# Patient Record
Sex: FEMALE | Race: WHITE | HISPANIC OR LATINO | Employment: FULL TIME | URBAN - METROPOLITAN AREA
[De-identification: names, ages, dates, MRNs, and addresses within clinical notes are randomized per-mention and may not be internally consistent; named-entity substitution may affect disease eponyms.]

---

## 2017-08-30 ENCOUNTER — HOSPITAL ENCOUNTER (EMERGENCY)
Facility: MEDICAL CENTER | Age: 62
End: 2017-08-30
Attending: EMERGENCY MEDICINE
Payer: COMMERCIAL

## 2017-08-30 VITALS
RESPIRATION RATE: 20 BRPM | BODY MASS INDEX: 33.78 KG/M2 | TEMPERATURE: 97.9 F | WEIGHT: 160.94 LBS | DIASTOLIC BLOOD PRESSURE: 76 MMHG | SYSTOLIC BLOOD PRESSURE: 116 MMHG | OXYGEN SATURATION: 99 % | HEIGHT: 58 IN | HEART RATE: 51 BPM

## 2017-08-30 DIAGNOSIS — G44.209 TENSION HEADACHE: ICD-10-CM

## 2017-08-30 DIAGNOSIS — S39.012A LUMBOSACRAL STRAIN, INITIAL ENCOUNTER: ICD-10-CM

## 2017-08-30 LAB
ALBUMIN SERPL BCP-MCNC: 3.9 G/DL (ref 3.2–4.9)
ALBUMIN/GLOB SERPL: 1.1 G/DL
ALP SERPL-CCNC: 100 U/L (ref 30–99)
ALT SERPL-CCNC: 21 U/L (ref 2–50)
ANION GAP SERPL CALC-SCNC: 5 MMOL/L (ref 0–11.9)
APPEARANCE UR: CLEAR
AST SERPL-CCNC: 22 U/L (ref 12–45)
BASOPHILS # BLD AUTO: 0.4 % (ref 0–1.8)
BASOPHILS # BLD: 0.03 K/UL (ref 0–0.12)
BILIRUB SERPL-MCNC: 0.7 MG/DL (ref 0.1–1.5)
BUN SERPL-MCNC: 16 MG/DL (ref 8–22)
CALCIUM SERPL-MCNC: 8.8 MG/DL (ref 8.4–10.2)
CHLORIDE SERPL-SCNC: 106 MMOL/L (ref 96–112)
CO2 SERPL-SCNC: 26 MMOL/L (ref 20–33)
COLOR UR: YELLOW
CREAT SERPL-MCNC: 0.53 MG/DL (ref 0.5–1.4)
EOSINOPHIL # BLD AUTO: 0.04 K/UL (ref 0–0.51)
EOSINOPHIL NFR BLD: 0.5 % (ref 0–6.9)
ERYTHROCYTE [DISTWIDTH] IN BLOOD BY AUTOMATED COUNT: 36.7 FL (ref 35.9–50)
GFR SERPL CREATININE-BSD FRML MDRD: >60 ML/MIN/1.73 M 2
GLOBULIN SER CALC-MCNC: 3.4 G/DL (ref 1.9–3.5)
GLUCOSE SERPL-MCNC: 100 MG/DL (ref 65–99)
GLUCOSE UR STRIP.AUTO-MCNC: NEGATIVE MG/DL
HCT VFR BLD AUTO: 36.2 % (ref 37–47)
HGB BLD-MCNC: 12.1 G/DL (ref 12–16)
IMM GRANULOCYTES # BLD AUTO: 0.01 K/UL (ref 0–0.11)
IMM GRANULOCYTES NFR BLD AUTO: 0.1 % (ref 0–0.9)
KETONES UR STRIP.AUTO-MCNC: NEGATIVE MG/DL
LEUKOCYTE ESTERASE UR QL STRIP.AUTO: NEGATIVE
LIPASE SERPL-CCNC: 28 U/L (ref 7–58)
LYMPHOCYTES # BLD AUTO: 1.42 K/UL (ref 1–4.8)
LYMPHOCYTES NFR BLD: 18.9 % (ref 22–41)
MCH RBC QN AUTO: 27.3 PG (ref 27–33)
MCHC RBC AUTO-ENTMCNC: 33.4 G/DL (ref 33.6–35)
MCV RBC AUTO: 81.7 FL (ref 81.4–97.8)
MONOCYTES # BLD AUTO: 0.35 K/UL (ref 0–0.85)
MONOCYTES NFR BLD AUTO: 4.7 % (ref 0–13.4)
NEUTROPHILS # BLD AUTO: 5.65 K/UL (ref 2–7.15)
NEUTROPHILS NFR BLD: 75.4 % (ref 44–72)
NITRITE UR QL STRIP.AUTO: NEGATIVE
NRBC # BLD AUTO: 0 K/UL
NRBC BLD AUTO-RTO: 0 /100 WBC
PH UR STRIP.AUTO: 6.5 [PH]
PLATELET # BLD AUTO: 213 K/UL (ref 164–446)
PMV BLD AUTO: 9.6 FL (ref 9–12.9)
POTASSIUM SERPL-SCNC: 3.7 MMOL/L (ref 3.6–5.5)
PROT SERPL-MCNC: 7.3 G/DL (ref 6–8.2)
PROT UR QL STRIP: 30 MG/DL
RBC # BLD AUTO: 4.43 M/UL (ref 4.2–5.4)
RBC UR QL AUTO: NEGATIVE
SODIUM SERPL-SCNC: 137 MMOL/L (ref 135–145)
SP GR UR STRIP.AUTO: 1.02
WBC # BLD AUTO: 7.5 K/UL (ref 4.8–10.8)

## 2017-08-30 PROCEDURE — 81002 URINALYSIS NONAUTO W/O SCOPE: CPT

## 2017-08-30 PROCEDURE — 85025 COMPLETE CBC W/AUTO DIFF WBC: CPT

## 2017-08-30 PROCEDURE — 700105 HCHG RX REV CODE 258: Performed by: EMERGENCY MEDICINE

## 2017-08-30 PROCEDURE — 36415 COLL VENOUS BLD VENIPUNCTURE: CPT

## 2017-08-30 PROCEDURE — 83690 ASSAY OF LIPASE: CPT

## 2017-08-30 PROCEDURE — 96374 THER/PROPH/DIAG INJ IV PUSH: CPT

## 2017-08-30 PROCEDURE — 96375 TX/PRO/DX INJ NEW DRUG ADDON: CPT

## 2017-08-30 PROCEDURE — 99284 EMERGENCY DEPT VISIT MOD MDM: CPT

## 2017-08-30 PROCEDURE — 700111 HCHG RX REV CODE 636 W/ 250 OVERRIDE (IP): Performed by: EMERGENCY MEDICINE

## 2017-08-30 PROCEDURE — 80053 COMPREHEN METABOLIC PANEL: CPT

## 2017-08-30 RX ORDER — KETOROLAC TROMETHAMINE 30 MG/ML
30 INJECTION, SOLUTION INTRAMUSCULAR; INTRAVENOUS ONCE
Status: COMPLETED | OUTPATIENT
Start: 2017-08-30 | End: 2017-08-30

## 2017-08-30 RX ORDER — ONDANSETRON 2 MG/ML
4 INJECTION INTRAMUSCULAR; INTRAVENOUS ONCE
Status: COMPLETED | OUTPATIENT
Start: 2017-08-30 | End: 2017-08-30

## 2017-08-30 RX ORDER — TRAMADOL HYDROCHLORIDE 50 MG/1
50-100 TABLET ORAL EVERY 6 HOURS PRN
Qty: 10 TAB | Refills: 0 | Status: SHIPPED | OUTPATIENT
Start: 2017-08-30 | End: 2018-09-22 | Stop reason: CLARIF

## 2017-08-30 RX ORDER — ONDANSETRON 4 MG/1
4 TABLET, ORALLY DISINTEGRATING ORAL EVERY 8 HOURS PRN
Qty: 10 TAB | Refills: 0 | Status: SHIPPED | OUTPATIENT
Start: 2017-08-30 | End: 2018-09-22 | Stop reason: CLARIF

## 2017-08-30 RX ORDER — SODIUM CHLORIDE 9 MG/ML
1000 INJECTION, SOLUTION INTRAVENOUS ONCE
Status: COMPLETED | OUTPATIENT
Start: 2017-08-30 | End: 2017-08-30

## 2017-08-30 RX ADMIN — SODIUM CHLORIDE 1000 ML: 9 INJECTION, SOLUTION INTRAVENOUS at 16:23

## 2017-08-30 RX ADMIN — KETOROLAC TROMETHAMINE 30 MG: 30 INJECTION, SOLUTION INTRAMUSCULAR at 17:05

## 2017-08-30 RX ADMIN — ONDANSETRON 4 MG: 2 INJECTION INTRAMUSCULAR; INTRAVENOUS at 16:23

## 2017-08-30 ASSESSMENT — PAIN SCALES - GENERAL
PAINLEVEL_OUTOF10: 9
PAINLEVEL_OUTOF10: 7

## 2017-08-30 NOTE — ED PROVIDER NOTES
ED Provider Note    CHIEF COMPLAINT  Chief Complaint   Patient presents with   • N/V     Beginning this morning. Denies blood in emesis   • Headache     Today . Took tylenol without result   • Low Back Pain       HPI  Karen Tran is a 61 y.o. female who presents For evaluation of numerous complaints including nausea vomiting without diarrhea, mild headache left flank pain and burning with urination. Patient has an isolated medical history of thyroid disease. No significant medical history other than that. She has 5 normal pregnancies denies current pregnancy.. No associated high fever hematemesis hematochezia. She does not take any significant over-the-counter NSAIDs. She currently denies any abdominal pain no night sweats or weight loss.    REVIEW OF SYSTEMS  See HPI for further details. No high fevers chills night sweats weight loss numbness weakness tingling rash All other systems are negative.     PAST MEDICAL HISTORY  Past Medical History:   Diagnosis Date   • Thyroid disorder    Hyperlipidemia    FAMILY HISTORY  No history of bleeding disorder    SOCIAL HISTORY  Social History     Social History   • Marital status:      Spouse name: N/A   • Number of children: N/A   • Years of education: N/A     Social History Main Topics   • Smoking status: Never Smoker   • Smokeless tobacco: Not on file   • Alcohol use Yes      Comment: occasional   • Drug use: No   • Sexual activity: Not on file     Other Topics Concern   • Not on file     Social History Narrative   • No narrative on file     Nonsmoker no drugs or alcohol  SURGICAL HISTORY  No past surgical history on file.  None reported  CURRENT MEDICATIONS  No current facility-administered medications for this encounter.     Current Outpatient Prescriptions:   •  pravastatin (PRAVACHOL) 20 MG TABS, Take 20 mg by mouth every evening., Disp: , Rfl:   •  levothyroxine (SYNTHROID) 100 MCG TABS, Take 100 mcg by mouth every day., Disp: , Rfl:       ALLERGIES  No  "Known Allergies    PHYSICAL EXAM  VITAL SIGNS: /76   Pulse 60   Temp 36.6 °C (97.9 °F)   Resp 18   Ht 1.473 m (4' 10\")   Wt 73 kg (160 lb 15 oz)   BMI 33.64 kg/m²  Room air O2: 94    Constitutional: Patient appears mildly nauseous nontoxic however  HENT: Normocephalic, Atraumatic, Bilateral external ears normal, Oropharynx moist, No oral exudates, Nose normal.   Eyes: PERRLA, EOMI, Conjunctiva normal, No discharge.   Neck: Normal range of motion, No tenderness, Supple, No stridor.   Cardiovascular: Normal heart rate, Normal rhythm, No murmurs, No rubs, No gallops.   Thorax & Lungs: Normal breath sounds, No respiratory distress, No wheezing, No chest tenderness.   Abdomen: Bowel sounds normal, Soft, No tenderness, No masses, No pulsatile masses.   Skin: Warm, Dry, No erythema, No rash.   Back: Left-sided paraspinal spasm lumbar region, No CVA tenderness.   Extremities: Intact distal pulses, No edema, No tenderness, No cyanosis, No clubbing.   Musculoskeletal: Good range of motion in all major joints. No tenderness to palpation or major deformities noted.   Neurologic: Alert & oriented x 3, Normal motor function, Normal sensory function, No focal deficits noted.   Psychiatric: Anxious      RADIOLOGY/PROCEDURES  Results for orders placed or performed during the hospital encounter of 08/30/17   CBC WITH DIFFERENTIAL   Result Value Ref Range    WBC 7.5 4.8 - 10.8 K/uL    RBC 4.43 4.20 - 5.40 M/uL    Hemoglobin 12.1 12.0 - 16.0 g/dL    Hematocrit 36.2 (L) 37.0 - 47.0 %    MCV 81.7 81.4 - 97.8 fL    MCH 27.3 27.0 - 33.0 pg    MCHC 33.4 (L) 33.6 - 35.0 g/dL    RDW 36.7 35.9 - 50.0 fL    Platelet Count 213 164 - 446 K/uL    MPV 9.6 9.0 - 12.9 fL    Neutrophils-Polys 75.40 (H) 44.00 - 72.00 %    Lymphocytes 18.90 (L) 22.00 - 41.00 %    Monocytes 4.70 0.00 - 13.40 %    Eosinophils 0.50 0.00 - 6.90 %    Basophils 0.40 0.00 - 1.80 %    Immature Granulocytes 0.10 0.00 - 0.90 %    Nucleated RBC 0.00 /100 WBC    " Neutrophils (Absolute) 5.65 2.00 - 7.15 K/uL    Lymphs (Absolute) 1.42 1.00 - 4.80 K/uL    Monos (Absolute) 0.35 0.00 - 0.85 K/uL    Eos (Absolute) 0.04 0.00 - 0.51 K/uL    Baso (Absolute) 0.03 0.00 - 0.12 K/uL    Immature Granulocytes (abs) 0.01 0.00 - 0.11 K/uL    NRBC (Absolute) 0.00 K/uL   COMP METABOLIC PANEL   Result Value Ref Range    Sodium 137 135 - 145 mmol/L    Potassium 3.7 3.6 - 5.5 mmol/L    Chloride 106 96 - 112 mmol/L    Co2 26 20 - 33 mmol/L    Anion Gap 5.0 0.0 - 11.9    Glucose 100 (H) 65 - 99 mg/dL    Bun 16 8 - 22 mg/dL    Creatinine 0.53 0.50 - 1.40 mg/dL    Calcium 8.8 8.4 - 10.2 mg/dL    AST(SGOT) 22 12 - 45 U/L    ALT(SGPT) 21 2 - 50 U/L    Alkaline Phosphatase 100 (H) 30 - 99 U/L    Total Bilirubin 0.7 0.1 - 1.5 mg/dL    Albumin 3.9 3.2 - 4.9 g/dL    Total Protein 7.3 6.0 - 8.2 g/dL    Globulin 3.4 1.9 - 3.5 g/dL    A-G Ratio 1.1 g/dL   LIPASE   Result Value Ref Range    Lipase 28 7 - 58 U/L   ESTIMATED GFR   Result Value Ref Range    GFR If African American >60 >60 mL/min/1.73 m 2    GFR If Non African American >60 >60 mL/min/1.73 m 2   POC UA   Result Value Ref Range    POC Color Yellow     POC Appearance Clear     POC Glucose Negative Negative mg/dL    POC Ketones Negative Negative mg/dL    POC Specific Gravity 1.020 1.005 - 1.030    POC Blood Negative Negative    POC Urine PH 6.5 5.0 - 8.0    POC Protein 30 (A) Negative mg/dL    POC Nitrites Negative Negative    POC Leukocyte Esterase Negative Negative        COURSE & MEDICAL DECISION MAKING  Pertinent Labs & Imaging studies reviewed. (See chart for details)  An IV was established. The patient was given IV nausea medication. Clinically she did not appear toxic. Vital signs were stable and normal. Extensive laboratory studies including CBC and metabolic panel urinalysis are all unremarkable. She does not appear affected. I suspect that she has muscular skeletal back pain with a tension-type headache. No indication for emergent imaging or  CT scan as she has a normal neurological exam. I also administered some Toradol. I will send her home with some Zofran and a small amount of Ultram. I recommended that she follow up with her PCP in 2-3 days and return here if symptoms progress or worsen    FINAL IMPRESSION  1. Tension headache  2. Lumbosacral strain       Electronically signed by: Vimal Salomon, 8/30/2017 4:08 PM

## 2017-08-30 NOTE — ED NOTES
Call light in reach. Man in low and locked position. Pt instructed to call for assistance. Son is at bedside. Pt O2 sat drops to 88% on RA while sleeping. 2L O2 via NC applied. Pt denies sob.

## 2017-08-30 NOTE — ED NOTES
Chief Complaint   Patient presents with   • N/V     Beginning this morning. Denies blood in emesis   • Headache     Today . Took tylenol without result   • Low Back Pain   Pt denies urinary symptoms. Reports regular urination, BM.

## 2017-08-31 NOTE — DISCHARGE INSTRUCTIONS
Dolor de basilio general sin causa   (General Headache Without Cause)   Un dolor de basilio en general es un dolor o malestar que se siente en la danyelle de la basilio o del jose. Se desconocen las causas.   CUIDADOS EN EL HOGAR   · Cumpla con los controles médicos según las indicaciones.  · [place] sólo los medicamentos que le haya indicado el médico.  · Cuando sienta dolor de basilio acuéstese en un cuarto oscuro y tranquilo.  · Lleve un registro diario para averiguar si ciertas cosas provocan angie de basilio. Por ejemplo, escriba:  ¨ Lo que come y vivi.  ¨ Cuánto tiempo duerme.  ¨ Todo cambio en la dieta o medicamentos.  · Relájese recibiendo masajes o ruby otras actividades relajantes.  · Coloque hielo o calor en la basilio y el jose jacqueline lo indique brasher médico.  · DISMINUYA EL NIVEL DE ESTRÉS  · Siéntase con la espalda recta. No apriete los músculos (tensione).  · Si fuma, deje de hacerlo.  · [place] menos alcohol.  · Consuma menos cafeína o deje de tomarla.  · Coma y duerma en horarios regulares.  · Duerma entre 7 y 9 horas o jacqueline le indique brasher médico.  · Mantenga las luces tenues si le molesta las luces brillantes o nickie angie de basilio empeoran.  SOLICITE AYUDA DE INMEDIATO SI:   · El dolor de basilio empeora.  · Tiene fiebre.  · Presenta rigidez en el jose.  · Tiene dificultad para angeles.  · Nickie músculos están débiles o pierde el control muscular.  · Pierde equilibrio o tiene problemas para caminar.  · Siente que se desvanece (debilidad) o se desmaya.  · Tiene síntomas intensos que son diferentes a los primeros síntomas.  · Tiene problemas con los medicamentos que le recetó brasher médico.  · El medicamento no le hace efecto.  · Siente que el dolor de basilio es diferente a otros angie de basilio.  · Tiene malestar estomacal (náuseas) o (vómitos).     Esta información no tiene jacqueline fin reemplazar el consejo del médico. Asegúrese de hacerle al médico cualquier pregunta que tenga.     Document Released: 03/11/2013 Document  Revised: 05/03/2016  Elsevier Interactive Patient Education ©2016 [employer]

## 2017-08-31 NOTE — ED NOTES
Pt discharged with written instructions. Pt verbalized understanding of all instructions. Pt ambulating independently. Pt reports pain and nausea is much better. Son at bedside to drive pt home. VSS. No s/s of distress.

## 2018-09-22 ENCOUNTER — APPOINTMENT (OUTPATIENT)
Dept: RADIOLOGY | Facility: MEDICAL CENTER | Age: 63
End: 2018-09-22
Attending: EMERGENCY MEDICINE
Payer: COMMERCIAL

## 2018-09-22 ENCOUNTER — HOSPITAL ENCOUNTER (EMERGENCY)
Facility: MEDICAL CENTER | Age: 63
End: 2018-09-22
Attending: EMERGENCY MEDICINE
Payer: COMMERCIAL

## 2018-09-22 VITALS
BODY MASS INDEX: 35.03 KG/M2 | RESPIRATION RATE: 17 BRPM | HEART RATE: 55 BPM | SYSTOLIC BLOOD PRESSURE: 110 MMHG | HEIGHT: 58 IN | DIASTOLIC BLOOD PRESSURE: 61 MMHG | OXYGEN SATURATION: 96 % | WEIGHT: 166.89 LBS | TEMPERATURE: 97.3 F

## 2018-09-22 DIAGNOSIS — N39.0 ACUTE UTI: ICD-10-CM

## 2018-09-22 DIAGNOSIS — M54.50 ACUTE LEFT-SIDED LOW BACK PAIN WITHOUT SCIATICA: ICD-10-CM

## 2018-09-22 LAB
ALBUMIN SERPL BCP-MCNC: 4.4 G/DL (ref 3.2–4.9)
ALBUMIN/GLOB SERPL: 1.4 G/DL
ALP SERPL-CCNC: 104 U/L (ref 30–99)
ALT SERPL-CCNC: 25 U/L (ref 2–50)
ANION GAP SERPL CALC-SCNC: 5 MMOL/L (ref 0–11.9)
APPEARANCE UR: CLEAR
APPEARANCE UR: CLEAR
AST SERPL-CCNC: 27 U/L (ref 12–45)
BACTERIA #/AREA URNS HPF: ABNORMAL /HPF
BASOPHILS # BLD AUTO: 0.7 % (ref 0–1.8)
BASOPHILS # BLD: 0.04 K/UL (ref 0–0.12)
BILIRUB SERPL-MCNC: 0.3 MG/DL (ref 0.1–1.5)
BILIRUB UR QL STRIP.AUTO: NEGATIVE
BUN SERPL-MCNC: 17 MG/DL (ref 8–22)
CALCIUM SERPL-MCNC: 9 MG/DL (ref 8.4–10.2)
CHLORIDE SERPL-SCNC: 108 MMOL/L (ref 96–112)
CO2 SERPL-SCNC: 25 MMOL/L (ref 20–33)
COLOR UR: YELLOW
COLOR UR: YELLOW
CREAT SERPL-MCNC: 0.66 MG/DL (ref 0.5–1.4)
EOSINOPHIL # BLD AUTO: 0.16 K/UL (ref 0–0.51)
EOSINOPHIL NFR BLD: 2.9 % (ref 0–6.9)
EPI CELLS #/AREA URNS HPF: ABNORMAL /HPF
ERYTHROCYTE [DISTWIDTH] IN BLOOD BY AUTOMATED COUNT: 37.9 FL (ref 35.9–50)
GLOBULIN SER CALC-MCNC: 3.2 G/DL (ref 1.9–3.5)
GLUCOSE SERPL-MCNC: 98 MG/DL (ref 65–99)
GLUCOSE UR STRIP.AUTO-MCNC: NEGATIVE MG/DL
GLUCOSE UR STRIP.AUTO-MCNC: NEGATIVE MG/DL
HCT VFR BLD AUTO: 39.3 % (ref 37–47)
HGB BLD-MCNC: 12.9 G/DL (ref 12–16)
IMM GRANULOCYTES # BLD AUTO: 0.01 K/UL (ref 0–0.11)
IMM GRANULOCYTES NFR BLD AUTO: 0.2 % (ref 0–0.9)
KETONES UR STRIP.AUTO-MCNC: NEGATIVE MG/DL
KETONES UR STRIP.AUTO-MCNC: NEGATIVE MG/DL
LEUKOCYTE ESTERASE UR QL STRIP.AUTO: ABNORMAL
LEUKOCYTE ESTERASE UR QL STRIP.AUTO: ABNORMAL
LIPASE SERPL-CCNC: 34 U/L (ref 7–58)
LYMPHOCYTES # BLD AUTO: 1.9 K/UL (ref 1–4.8)
LYMPHOCYTES NFR BLD: 34.5 % (ref 22–41)
MCH RBC QN AUTO: 26.9 PG (ref 27–33)
MCHC RBC AUTO-ENTMCNC: 32.8 G/DL (ref 33.6–35)
MCV RBC AUTO: 82 FL (ref 81.4–97.8)
MICRO URNS: ABNORMAL
MONOCYTES # BLD AUTO: 0.47 K/UL (ref 0–0.85)
MONOCYTES NFR BLD AUTO: 8.5 % (ref 0–13.4)
MUCOUS THREADS #/AREA URNS HPF: ABNORMAL /HPF
NEUTROPHILS # BLD AUTO: 2.92 K/UL (ref 2–7.15)
NEUTROPHILS NFR BLD: 53.2 % (ref 44–72)
NITRITE UR QL STRIP.AUTO: NEGATIVE
NITRITE UR QL STRIP.AUTO: NEGATIVE
NRBC # BLD AUTO: 0 K/UL
NRBC BLD-RTO: 0 /100 WBC
PH UR STRIP.AUTO: 6.5 [PH]
PH UR STRIP.AUTO: 7 [PH]
PLATELET # BLD AUTO: 222 K/UL (ref 164–446)
PMV BLD AUTO: 9.5 FL (ref 9–12.9)
POTASSIUM SERPL-SCNC: 4 MMOL/L (ref 3.6–5.5)
PROT SERPL-MCNC: 7.6 G/DL (ref 6–8.2)
PROT UR QL STRIP: ABNORMAL MG/DL
PROT UR QL STRIP: NEGATIVE MG/DL
RBC # BLD AUTO: 4.79 M/UL (ref 4.2–5.4)
RBC # URNS HPF: ABNORMAL /HPF
RBC UR QL AUTO: NEGATIVE
RBC UR QL AUTO: NEGATIVE
SODIUM SERPL-SCNC: 138 MMOL/L (ref 135–145)
SP GR UR STRIP.AUTO: 1.02
SP GR UR STRIP.AUTO: 1.02
UNIDENT CRYS URNS QL MICRO: ABNORMAL /HPF
WBC # BLD AUTO: 5.5 K/UL (ref 4.8–10.8)
WBC #/AREA URNS HPF: ABNORMAL /HPF

## 2018-09-22 PROCEDURE — 74177 CT ABD & PELVIS W/CONTRAST: CPT

## 2018-09-22 PROCEDURE — 81002 URINALYSIS NONAUTO W/O SCOPE: CPT

## 2018-09-22 PROCEDURE — 99285 EMERGENCY DEPT VISIT HI MDM: CPT

## 2018-09-22 PROCEDURE — 80053 COMPREHEN METABOLIC PANEL: CPT

## 2018-09-22 PROCEDURE — 96375 TX/PRO/DX INJ NEW DRUG ADDON: CPT

## 2018-09-22 PROCEDURE — 700117 HCHG RX CONTRAST REV CODE 255: Performed by: EMERGENCY MEDICINE

## 2018-09-22 PROCEDURE — 700111 HCHG RX REV CODE 636 W/ 250 OVERRIDE (IP): Performed by: EMERGENCY MEDICINE

## 2018-09-22 PROCEDURE — 83690 ASSAY OF LIPASE: CPT

## 2018-09-22 PROCEDURE — 36415 COLL VENOUS BLD VENIPUNCTURE: CPT

## 2018-09-22 PROCEDURE — 700105 HCHG RX REV CODE 258: Performed by: EMERGENCY MEDICINE

## 2018-09-22 PROCEDURE — 96365 THER/PROPH/DIAG IV INF INIT: CPT

## 2018-09-22 PROCEDURE — 81001 URINALYSIS AUTO W/SCOPE: CPT

## 2018-09-22 PROCEDURE — 85025 COMPLETE CBC W/AUTO DIFF WBC: CPT

## 2018-09-22 RX ORDER — PRAVASTATIN SODIUM 20 MG
20 TABLET ORAL NIGHTLY
COMMUNITY

## 2018-09-22 RX ORDER — ONDANSETRON 2 MG/ML
4 INJECTION INTRAMUSCULAR; INTRAVENOUS ONCE
Status: COMPLETED | OUTPATIENT
Start: 2018-09-22 | End: 2018-09-22

## 2018-09-22 RX ORDER — HYDROCODONE BITARTRATE AND ACETAMINOPHEN 5; 325 MG/1; MG/1
1-2 TABLET ORAL EVERY 6 HOURS PRN
Qty: 12 TAB | Refills: 0 | Status: SHIPPED | OUTPATIENT
Start: 2018-09-22 | End: 2018-09-25

## 2018-09-22 RX ORDER — CEFDINIR 300 MG/1
300 CAPSULE ORAL 2 TIMES DAILY
Qty: 14 CAP | Refills: 0 | Status: SHIPPED | OUTPATIENT
Start: 2018-09-22 | End: 2018-09-29

## 2018-09-22 RX ORDER — LEVOTHYROXINE SODIUM 88 UG/1
88 TABLET ORAL
COMMUNITY

## 2018-09-22 RX ORDER — MORPHINE SULFATE 4 MG/ML
4 INJECTION, SOLUTION INTRAMUSCULAR; INTRAVENOUS ONCE
Status: COMPLETED | OUTPATIENT
Start: 2018-09-22 | End: 2018-09-22

## 2018-09-22 RX ORDER — METHOCARBAMOL 750 MG/1
375-750 TABLET, FILM COATED ORAL
COMMUNITY

## 2018-09-22 RX ORDER — KETOROLAC TROMETHAMINE 30 MG/ML
15 INJECTION, SOLUTION INTRAMUSCULAR; INTRAVENOUS ONCE
Status: COMPLETED | OUTPATIENT
Start: 2018-09-22 | End: 2018-09-22

## 2018-09-22 RX ADMIN — KETOROLAC TROMETHAMINE 15 MG: 30 INJECTION, SOLUTION INTRAMUSCULAR at 11:47

## 2018-09-22 RX ADMIN — CEFTRIAXONE 1 G: 1 INJECTION, POWDER, FOR SOLUTION INTRAMUSCULAR; INTRAVENOUS at 11:47

## 2018-09-22 RX ADMIN — IOHEXOL 100 ML: 350 INJECTION, SOLUTION INTRAVENOUS at 09:58

## 2018-09-22 RX ADMIN — MORPHINE SULFATE 4 MG: 4 INJECTION INTRAVENOUS at 09:25

## 2018-09-22 RX ADMIN — ONDANSETRON HYDROCHLORIDE 4 MG: 2 INJECTION INTRAMUSCULAR; INTRAVENOUS at 09:25

## 2018-09-22 ASSESSMENT — PAIN SCALES - GENERAL
PAINLEVEL_OUTOF10: 4
PAINLEVEL_OUTOF10: 4

## 2018-09-22 NOTE — ED NOTES
Pt resting in a position of comfort with 0 s/s distress noted. Awaiting ER disp decision. Pt denies needs.

## 2018-09-22 NOTE — ED NOTES
Medicated as ordered.  Pt and family member aware waiting for pending test results and CT.  Repositioned on gurney for comfort.

## 2018-09-22 NOTE — ED PROVIDER NOTES
ED Provider Note    CHIEF COMPLAINT  Chief Complaint   Patient presents with   • Abdominal Pain     Pt c/o abd pain onset x 12 days.    • Flank Pain     Pt c/o bilateral flank pain x 12 days. Pt denies urinary difficulty or pain.        HPI  Karen Tran is a 62 y.o. female who presents to the emergency department complaining of abdominal pain, and flank pain.  Pain is been there for the last 12 days.  Is more in the back and the front is more on the left than the right.  Is worsened by movement in particular.  Denies any falls or trauma.  Denies any cons of urine or stool.  Denies any numbness tingling into her legs.  No saddle anesthesia.  No fevers chills or injection drug abuse.  She has no nausea no vomiting no abdominal pain no diarrhea no constipation.  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  She has not had a kidney stone UTI in the past.  No previous operations.  No other aggravating alleviating factors or associated complaints.    REVIEW OF SYSTEMS  See HPI for further details. All other systems are negative.    PAST MEDICAL HISTORY  Past Medical History:   Diagnosis Date   • Thyroid disorder        FAMILY HISTORY  History reviewed. No pertinent family history.    SOCIAL HISTORY  Social History     Social History   • Marital status:      Spouse name: N/A   • Number of children: N/A   • Years of education: N/A     Social History Main Topics   • Smoking status: Never Smoker   • Smokeless tobacco: Never Used   • Alcohol use Yes      Comment: occasional   • Drug use: No   • Sexual activity: Not on file     Other Topics Concern   • Not on file     Social History Narrative   • No narrative on file       SURGICAL HISTORY  History reviewed. No pertinent surgical history.    CURRENT MEDICATIONS  Home Medications     Reviewed by Stacey Ovalles (Pharmacy Tech) on 09/22/18 at 0900  Med List Status: Complete   Medication Last Dose Status   levothyroxine (SYNTHROID) 88 MCG Tab 9/22/2018 Active  "  methocarbamol (ROBAXIN) 750 MG Tab 9/21/2018 Active   pravastatin (PRAVACHOL) 20 MG Tab 9/20/2018 Active                ALLERGIES  No Known Allergies    PHYSICAL EXAM  VITAL SIGNS: /94   Pulse 67   Temp 36.5 °C (97.7 °F)   Resp 17   Ht 1.473 m (4' 10\")   Wt 75.7 kg (166 lb 14.2 oz)   BMI 34.88 kg/m²    Constitutional: Well developed, Well nourished, No acute distress, Non-toxic appearance.   HENT: Normocephalic, Atraumatic, Bilateral external ears normal, Oropharynx moist, No oral exudates, Nose normal.   Eyes: PERRL, EOMI, Conjunctiva normal, No discharge.   Neck: Normal range of motion, No tenderness, Supple, No stridor.   Lymphatic: No lymphadenopathy noted.   Cardiovascular: Normal heart rate, Normal rhythm, No murmurs, No rubs, No gallops.   Thorax & Lungs: Normal breath sounds, No respiratory distress, No wheezing, No chest tenderness.   Abdomen: Bowel sounds normal, Soft, No tenderness, No masses,   Skin: Warm, Dry, No erythema, No rash.   Back: No midline tenderness.  There is some tenderness in the left paraspinal muscles in the lumbar region and some spasm as well.  Palpation here reproduces her pain..   Musculoskeletal: Good range of motion in all major joints.  Good pulses in both feet.  Neurologic: Alert,  No focal deficits noted.  Strength in both lower extremities including plantarflexion dorsiflexion great toe raise, symmetric DTRs at the ankle and patella.  Psychiatric: Affect normal    RADIOLOGY/PROCEDURES  CT-ABDOMEN-PELVIS WITH   Final Result      1.  No acute findings.   2.  Normal appendix.   3.  Colonic diverticulosis without evidence for diverticulitis.        Results for orders placed or performed during the hospital encounter of 09/22/18   CBC WITH DIFFERENTIAL   Result Value Ref Range    WBC 5.5 4.8 - 10.8 K/uL    RBC 4.79 4.20 - 5.40 M/uL    Hemoglobin 12.9 12.0 - 16.0 g/dL    Hematocrit 39.3 37.0 - 47.0 %    MCV 82.0 81.4 - 97.8 fL    MCH 26.9 (L) 27.0 - 33.0 pg    MCHC " 32.8 (L) 33.6 - 35.0 g/dL    RDW 37.9 35.9 - 50.0 fL    Platelet Count 222 164 - 446 K/uL    MPV 9.5 9.0 - 12.9 fL    Neutrophils-Polys 53.20 44.00 - 72.00 %    Lymphocytes 34.50 22.00 - 41.00 %    Monocytes 8.50 0.00 - 13.40 %    Eosinophils 2.90 0.00 - 6.90 %    Basophils 0.70 0.00 - 1.80 %    Immature Granulocytes 0.20 0.00 - 0.90 %    Nucleated RBC 0.00 /100 WBC    Neutrophils (Absolute) 2.92 2.00 - 7.15 K/uL    Lymphs (Absolute) 1.90 1.00 - 4.80 K/uL    Monos (Absolute) 0.47 0.00 - 0.85 K/uL    Eos (Absolute) 0.16 0.00 - 0.51 K/uL    Baso (Absolute) 0.04 0.00 - 0.12 K/uL    Immature Granulocytes (abs) 0.01 0.00 - 0.11 K/uL    NRBC (Absolute) 0.00 K/uL   COMP METABOLIC PANEL   Result Value Ref Range    Sodium 138 135 - 145 mmol/L    Potassium 4.0 3.6 - 5.5 mmol/L    Chloride 108 96 - 112 mmol/L    Co2 25 20 - 33 mmol/L    Anion Gap 5.0 0.0 - 11.9    Glucose 98 65 - 99 mg/dL    Bun 17 8 - 22 mg/dL    Creatinine 0.66 0.50 - 1.40 mg/dL    Calcium 9.0 8.4 - 10.2 mg/dL    AST(SGOT) 27 12 - 45 U/L    ALT(SGPT) 25 2 - 50 U/L    Alkaline Phosphatase 104 (H) 30 - 99 U/L    Total Bilirubin 0.3 0.1 - 1.5 mg/dL    Albumin 4.4 3.2 - 4.9 g/dL    Total Protein 7.6 6.0 - 8.2 g/dL    Globulin 3.2 1.9 - 3.5 g/dL    A-G Ratio 1.4 g/dL   LIPASE   Result Value Ref Range    Lipase 34 7 - 58 U/L   URINALYSIS CULTURE, IF INDICATED   Result Value Ref Range    Color Yellow     Character Clear     Specific Gravity 1.020 <1.035    Ph 6.5 5.0 - 8.0    Glucose Negative Negative mg/dL    Ketones Negative Negative mg/dL    Protein Negative Negative mg/dL    Bilirubin Negative Negative    Nitrite Negative Negative    Leukocyte Esterase Trace (A) Negative    Occult Blood Negative Negative    Micro Urine Req Microscopic    ESTIMATED GFR   Result Value Ref Range    GFR If African American >60 >60 mL/min/1.73 m 2    GFR If Non African American >60 >60 mL/min/1.73 m 2   URINE MICROSCOPIC (W/UA)   Result Value Ref Range    WBC 5-10 (A) /hpf    RBC  0-2 /hpf    Bacteria Few (A) None /hpf    Epithelial Cells Moderate (A) Few /hpf    Mucous Threads Moderate /hpf    Urine Crystals Few Amorphous /hpf   POC UA   Result Value Ref Range    POC Color Yellow     POC Appearance Clear     POC Glucose Negative Negative mg/dL    POC Ketones Negative Negative mg/dL    POC Specific Gravity 1.020 1.005 - 1.030    POC Blood Negative Negative    POC Urine PH 7.0 5.0 - 8.0    POC Protein Trace (A) Negative mg/dL    POC Nitrites Negative Negative    POC Leukocyte Esterase Trace (A) Negative        COURSE & MEDICAL DECISION MAKING  Pertinent Labs & Imaging studies reviewed. (See chart for details)  The patient presents the emergency department with back pain and flank pain.  This is been going on for nearly 2 weeks.  She appears acutely uncomfortable today.    Her abdomen is relatively benign she is no focal tenderness although she has pain in the back and left lower quadrant of her abdomen.  Broad original diagnosis was considered including but not limited to renal colic, pyelonephritis, muscular skeletal back pain, sciatica diverticulitis, bowel obstruction the malignancy to name a few.    The patient was worked up with labs and urinalysis.  Urinalysis does look positive for UTI but only minimally so.  CBC CMP and lipase are normal no leukocytosis or anemia.    CT shows diverticulosis but no diverticulitis.  Patient is reexamined after some pain medicines here in the ER is still having more back pain but has no abdominal pain.  She has no tenderness on the second examination.  She is given some Toradol IV and a dose of antibiotics for UTI.  After which time she is reassessed and she feels much better.  Abdominal exam remains benign.    At this point I do not see any evidence of abdominal pathology.  Her abdomen is benign without tenderness or peritonitis on 3 separate occasions she has normal labs and a CT that shows diverticulosis but no diverticulitis and no other acute ab  normality.  She will be discharged home with abdominal pain of unclear etiology return precautions.  She will return 24 hours if she is not feeling better.  Return sooner for pain nausea vomiting or chills or other concerns.    The patient works in a warehouse lifting books.  Her pain seems to muscular skeletal back pain.  Treat her conservatively with some pain medications rest and return precautions and follow-up primary care.  Is agreeable with this plan.  Her questions are answered.    He is put on Omnicef for UTI.  She is prescribed Norco for back pain and advised to take a few days off work.  In prescribing controlled substances to this patient, I certify that I have obtained and reviewed the medical history of Karen Tran. I have also made a good zaid effort to obtain applicable records from other providers who have treated the patient and records did not demonstrate any increased risk of substance abuse that would prevent me from prescribing controlled substances.     I have conducted a physical exam and documented it. I have reviewed Ms. Tran’s prescription history as maintained by the [place] Prescription Monitoring Program.     I have assessed the patient’s risk for abuse, dependency, and addiction using the validated Opioid Risk Tool available at https://www.mdcalc.com/zmslha-brzu-itqm-ort-narcotic-abuse.     Given the above, I believe the benefits of controlled substance therapy outweigh the risks. The reasons for prescribing controlled substances include non-narcotic, oral analgesic alternatives have been inadequate for pain control. Accordingly, I have discussed the risk and benefits, treatment plan, and alternative therapies with the patient.         The patient was noted to have elevated blood pressure while in the ER and was counseled to see their doctor within one wee to have this rechecked    FINAL IMPRESSION  1. Acute left-sided low back pain without sciatica Active   2. Acute UTI  Active             Electronically signed by: Vimal Polo, 9/22/2018 9:18 AM

## 2018-09-22 NOTE — DISCHARGE INSTRUCTIONS
Rest, return to the emergency department for recheck if you are still having pain in 24 hours.  Return sooner for worsening pain, , fever, or other concerns.  Follow-up with your doctor.    Off work for 3 days.

## 2018-09-22 NOTE — ED NOTES
DC instructions and prescription x2 given to pt/son with use of medical translation right. Pt verbalized understanding, and was provided opportunity to ask questions. Pt steady on feet with 0 s/s distress noted. Pt dcd home with son to drive.

## 2018-09-22 NOTE — ED TRIAGE NOTES
"Chief Complaint   Patient presents with   • Abdominal Pain     Pt c/o abd pain onset x 12 days.    • Flank Pain     Pt c/o bilateral flank pain x 12 days. Pt denies urinary difficulty or pain.      /94   Pulse 67   Temp 36.5 °C (97.7 °F)   Resp 17   Ht 1.473 m (4' 10\")   Wt 75.7 kg (166 lb 14.2 oz)   BMI 34.88 kg/m²     "

## 2019-04-09 ENCOUNTER — TELEPHONE (OUTPATIENT)
Dept: HEALTH INFORMATION MANAGEMENT | Facility: OTHER | Age: 64
End: 2019-04-09

## 2019-05-22 ENCOUNTER — APPOINTMENT (OUTPATIENT)
Dept: MEDICAL GROUP | Facility: MEDICAL CENTER | Age: 64
End: 2019-05-22
Payer: COMMERCIAL

## 2023-01-02 ENCOUNTER — HOSPITAL ENCOUNTER (EMERGENCY)
Facility: MEDICAL CENTER | Age: 68
End: 2023-01-02
Attending: EMERGENCY MEDICINE
Payer: COMMERCIAL

## 2023-01-02 ENCOUNTER — APPOINTMENT (OUTPATIENT)
Dept: RADIOLOGY | Facility: MEDICAL CENTER | Age: 68
End: 2023-01-02
Attending: EMERGENCY MEDICINE
Payer: COMMERCIAL

## 2023-01-02 VITALS
RESPIRATION RATE: 16 BRPM | BODY MASS INDEX: 32.86 KG/M2 | HEIGHT: 58 IN | TEMPERATURE: 98.2 F | DIASTOLIC BLOOD PRESSURE: 69 MMHG | OXYGEN SATURATION: 95 % | WEIGHT: 156.53 LBS | HEART RATE: 57 BPM | SYSTOLIC BLOOD PRESSURE: 142 MMHG

## 2023-01-02 DIAGNOSIS — S09.90XA CLOSED HEAD INJURY, INITIAL ENCOUNTER: ICD-10-CM

## 2023-01-02 DIAGNOSIS — M54.50 ACUTE LOW BACK PAIN WITHOUT SCIATICA, UNSPECIFIED BACK PAIN LATERALITY: ICD-10-CM
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
## 2023-01-02 PROCEDURE — 72125 CT NECK SPINE W/O DYE: CPT

## 2023-01-02 PROCEDURE — 70450 CT HEAD/BRAIN W/O DYE: CPT

## 2023-01-02 PROCEDURE — 72128 CT CHEST SPINE W/O DYE: CPT

## 2023-01-02 PROCEDURE — 700111 HCHG RX REV CODE 636 W/ 250 OVERRIDE (IP): Performed by: EMERGENCY MEDICINE

## 2023-01-02 PROCEDURE — 700102 HCHG RX REV CODE 250 W/ 637 OVERRIDE(OP): Performed by: EMERGENCY MEDICINE

## 2023-01-02 PROCEDURE — 72131 CT LUMBAR SPINE W/O DYE: CPT

## 2023-01-02 PROCEDURE — 96374 THER/PROPH/DIAG INJ IV PUSH: CPT

## 2023-01-02 PROCEDURE — A9270 NON-COVERED ITEM OR SERVICE: HCPCS | Performed by: EMERGENCY MEDICINE

## 2023-01-02 PROCEDURE — 99285 EMERGENCY DEPT VISIT HI MDM: CPT

## 2023-01-02 RX ORDER — CYCLOBENZAPRINE HCL 10 MG
10 TABLET ORAL 3 TIMES DAILY PRN
Qty: 30 TABLET | Refills: 0 | Status: SHIPPED | OUTPATIENT
Start: 2023-01-02

## 2023-01-02 RX ORDER — KETOROLAC TROMETHAMINE 30 MG/ML
15 INJECTION, SOLUTION INTRAMUSCULAR; INTRAVENOUS ONCE
Status: COMPLETED | OUTPATIENT
Start: 2023-01-02 | End: 2023-01-02

## 2023-01-02 RX ORDER — CYCLOBENZAPRINE HCL 10 MG
10 TABLET ORAL ONCE
Status: COMPLETED | OUTPATIENT
Start: 2023-01-02 | End: 2023-01-02

## 2023-01-02 RX ADMIN — CYCLOBENZAPRINE 10 MG: 10 TABLET, FILM COATED ORAL at 07:48

## 2023-01-02 RX ADMIN — KETOROLAC TROMETHAMINE 15 MG: 30 INJECTION, SOLUTION INTRAMUSCULAR; INTRAVENOUS at 09:20

## 2023-01-02 ASSESSMENT — LIFESTYLE VARIABLES: DO YOU DRINK ALCOHOL: YES

## 2023-01-02 ASSESSMENT — ENCOUNTER SYMPTOMS
SENSORY CHANGE: 0
NECK PAIN: 0
CHILLS: 0
SHORTNESS OF BREATH: 0
LOSS OF CONSCIOUSNESS: 1
HEADACHES: 1
FEVER: 0
BACK PAIN: 1

## 2023-01-02 NOTE — LETTER
"  FORM C-4:  EMPLOYEE’S CLAIM FOR COMPENSATION/ REPORT OF INITIAL TREATMENT  EMPLOYEE’S CLAIM - PROVIDE ALL INFORMATION REQUESTED   First Name Karen Last Name Chelsea Birthdate 1955  Sex female Claim Number   Home Address [address]   [facility] [facility]             [place] [address]                                   Age  67 y.o. Height  1.473 m (4' 10\") Weight  71 kg (156 lb 8.4 oz) [facility]     Mailing Address [address]  [facility] [facility]              Zip [address] Telephone  [phone] (home)  Primary Language Spoken  Vietnamese   Insurer   Third Party   ESIS Employee's Occupation (Job Title) When Injury or Occupational Disease Occurred  [employer]     Employer's Name [employer] Telephone [phone]    Employer Address [address]] Zip [address]   Date of Injury  1/2/2023       Hour of Injury  5:30 AM Date Employer Notified  1/9/2023 Last Day of Work after Injury or Occupational Disease  12/30/2022 Supervisor to Whom Injury Reported  Aaron Yan   Address or Location of Accident (if applicable) Work [1]   What were you doing at the time of accident? (if applicable) [employer]    How did this injury or occupational disease occur? Be specific and answer in detail. Use additional sheet if necessary)  me Resvaley ocAi de espalda y me di en la Billy   If you believe that you have an occupational disease, when did you first have knowledge of the disability and it relationship to your employment? N/A Witnesses to the Accident  NO persoNA de NANTINEIMIENTO   Nature of Injury or Occupational Disease  Workers' Compensation Part(s) of Body Injured or Affected  Skull, Upper Back Area (Thoracic Area), N/A    I CERTIFY THAT THE ABOVE IS TRUE AND CORRECT TO THE BEST OF MY KNOWLEDGE AND THAT I HAVE PROVIDED THIS INFORMATION IN ORDER TO OBTAIN THE BENEFITS OF [place]  I HEREBY AUTHORIZE ANY PHYSICIAN, CHIROPRACTOR, SURGEON, PRACTITIONER, OR OTHER PERSON, ANY HOSPITAL, INCLUDING [facility] OR [facility] H[facility]ITAL, ANY MEDICAL SERVICE ORGANIZATION, ANY INSURANCE COMPANY, OR OTHER INSTITUTION OR ORGANIZATION TO RELEASE TO EACH OTHER, ANY MEDICAL OR OTHER INFORMATION, INCLUDING BENEFITS PAID OR PAYABLE, PERTINENT TO THIS INJURY OR DISEASE, EXCEPT INFORMATION RELATIVE TO DIAGNOSIS, TREATMENT AND/OR COUNSELING FOR AIDS, PSYCHOLOGICAL CONDITIONS, ALCOHOL OR CONTROLLED SUBSTANCES, FOR WHICH I MUST GIVE SPECIFIC AUTHORIZATION.  A PHOTOSTAT OF THIS AUTHORIZATION SHALL BE AS VALID AS THE ORIGINAL.  Date   01/21/2023    Place  [facility]          Employee’s Signature   THIS REPORT MUST BE COMPLETED AND MAILED WITHIN 3 WORKING DAYS OF TREATMENT   Place [facility], EMERGENCY DEPT                       Name of Facility [facility]   Date  1/2/2023 Diagnosis  (W19.XXXA) Fall, initial encounter  (S09.90XA) Closed head injury, initial encounter  (M54.50) Acute low back pain without sciatica, unspecified back pain laterality Is there evidence the injured employee was under the influence of alcohol and/or another controlled substance at the time of accident?   Hour  2:56 PM Description of Injury or Disease  Fall, initial encounter  Closed head injury, initial encounter  Acute low back pain without sciatica, unspecified back pain laterality No   Treatment  Anti-inflammatories, muscle relaxants  Have you advised the patient to remain off work five days or more?         No   X-Ray Findings  Negative If Yes   From Date    To Date      From information given by the employee, together with medical evidence, can you directly connect this injury or occupational disease as job incurred? Yes If No, is employee capable of: Full Duty  No Modified Duty  Yes   Is additional medical care by a physician indicated? Yes If Modified  "Duty, Specify any Limitations / Restrictions   Limited lifting or working at heights until symptoms resolve   Do you know of any previous injury or disease contributing to this condition or occupational disease? No    Date 1/21/2023 Print Doctor’s Name Belkys Koehleranda M I certify the employer’s copy of this form was mailed on:   Address [address] [place] [address]  [place] [id number] [phone] INSURER’S USE ONLY   Provider’s Tax ID Number   Telephone Dept: [phone]    Doctor’s Signature sarah-CHARU Magaña M.D. Degree  M.D.      Form C-4 (rev.10/07)                                                                         BRIEF DESCRIPTION OF RIGHTS AND BENEFITS  ([place]ursuant to [place] 616[place])    Notice of Injury or Occupational Disease (Incident Report Form C-1): If an injury or occupational disease (OD) arises out of and in the course of employment, you must provide written notice to your employer as soon as practicable, but no later than 7 days after the accident or OD. Your employer shall maintain a sufficient supply of the required forms.    Claim for Compensation (Form C-4): If medical treatment is sought, the form C-4 is available at the place of initial treatment. A completed \"Claim for Compensation\" (Form C-4) must be filed within 90 days after an accident or OD. The treating physician or chiropractor must, within 3 working days after treatment, complete and mail to the employer, the employer's insurer and third-party , the Claim for Compensation.    Medical Treatment: If you require medical treatment for your on-the-job injury or OD, you may be required to select a physician or chiropractor from a list provided by your workers’ compensation insurer, if it has contracted with an Organization for Managed Care (MCO) or Preferred Provider Organization (PPO) or providers of health care. If your employer has not entered into a contract with an MCO or PPO, you may select a physician or chiropractor from the " Panel of Physicians and Chiropractors. Any medical costs related to your industrial injury or OD will be paid by your insurer.    Temporary Total Disability (TTD): If your doctor has certified that you are unable to work for a period of at least 5 consecutive days, or 5 cumulative days in a 20-day period, or places restrictions on you that your employer does not accommodate, you may be entitled to TTD compensation.    Temporary Partial Disability (TPD): If the wage you receive upon reemployment is less than the compensation for TTD to which you are entitled, the insurer may be required to pay you TPD compensation to make up the difference. TPD can only be paid for a maximum of 24 months.    Permanent Partial Disability (PPD): When your medical condition is stable and there is an indication of a PPD as a result of your injury or OD, within 30 days, your insurer must arrange for an evaluation by a rating physician or chiropractor to determine the degree of your PPD. The amount of your PPD award depends on the date of injury, the results of the PPD evaluation, your age and wage.    Permanent Total Disability (PTD): If you are medically certified by a treating physician or chiropractor as permanently and totally disabled and have been granted a PTD status by your insurer, you are entitled to receive monthly benefits not to exceed 66 2/3% of your average monthly wage. The amount of your PTD payments is subject to reduction if you previously received a lump-sum PPD award.    Vocational Rehabilitation Services: You may be eligible for vocational rehabilitation services if you are unable to return to the job due to a permanent physical impairment or permanent restrictions as a result of your injury or occupational disease.    Transportation and Per Cliff Reimbursement: You may be eligible for travel expenses and per cliff associated with medical treatment.    Reopening: You may be able to reopen your claim if your condition  worsens after claim closure.     Appeal Process: If you disagree with a written determination issued by the insurer or the insurer does not respond to your request, you may appeal to the Department of Administration, , by following the instructions contained in your determination letter. You must appeal the determination within 70 days from the date of the determination letter at [address], [address], [place], [place] [address], or [address], [address], [place], [place] [address]. If you disagree with the  decision, you may appeal to the Department of Administration, . You must file your appeal within 30 days from the date of the  decision letter at [address], [address], [place], [place] [address], or [address] [facility] [address], [place], [place] [address]. If you disagree with a decision of an , you may file a petition for judicial review with the District Court. You must do so within 30 days of the Appeal Officer’s decision. You may be represented by an  at your own expense or you may contact the [facility] for possible representation.    [place]  for Injured Workers (NAIW): If you disagree with a  decision, you may request that NAIW represent you without charge at an  Hearing. For information regarding denial of benefits, you may contact the [facility] at: [address], [address], [place] [address], [phone], or [address], [place] [address], [phone]    To File a Complaint with the Division: If you wish to file a complaint with the  of the Division of Industrial Relations (DIR),  please contact the Workers’ Compensation Section, [address], [address], [place], [place] [address], telephone [phone]) [phone], or [address], [place], [place] [address], telephone [phone].    For  assistance with Workers’ Compensation Issues: You may contact the [facility] Office for Consumer Health Assistance, [facility] [facility], [address], [place] [address], Toll Free 1-307.936.3899, Web site: http://[facility].[place].gov/Programs/LORE E-mail: lore@[facility].nv.gov  D-2 (rev. 10/20)              __________________________________________________________________                                    _01/21/2023__            Employee Name / Signature                                                                                                                            Date

## 2023-01-02 NOTE — ED NOTES
Pt arrived to [facility] via EMS able to transfer to [facility] independently, placed on monitor, awake and alert,  given call bell, son at b/s, awaiting MD evaluation.

## 2023-01-02 NOTE — ED PROVIDER NOTES
ER Provider Note    Scribed for Lauren Koehler M.d. by Micha Dias. 1/2/2023  6:49 AM    Primary Care Provider: Crystal Sanchez P.A.-C.    CHIEF COMPLAINT  Chief Complaint   Patient presents with    Fall     Slipped and fell on ice hitting back of head, no LOC, no thinners, contusion to posterior scalp       HPI  Karen Tran is a 67 y.o. female with a history of diabetes and anemia who presents to the ED complaining of head pain and back pain following a fall that occurred this morning. She was going into work, and slipped on ice. She fell backwards and hit the back of her head and landed on her back. She believes that she lost consciousness during the fall. She has associated diffuse back pain. She denies any neck pain.  She describes her headache and back pain is moderate throbbing pain.  Denies numbness, tingling or weakness.  No alleviating or exacerbating factors noted.    EXTERNAL RECORDS REVIEWED  Select: None  LIMITATION TO HISTORY   Select: : None  OUTSIDE HISTORIAN(S):  Select: Family (Son)    REVIEW OF SYSTEMS  Review of Systems   Constitutional:  Negative for chills and fever.   Respiratory:  Negative for shortness of breath.    Cardiovascular:  Negative for chest pain.   Musculoskeletal:  Positive for back pain. Negative for neck pain.   Neurological:  Positive for loss of consciousness and headaches. Negative for sensory change.   All other systems reviewed and are negative.     PAST MEDICAL HISTORY  Past Medical History:   Diagnosis Date    Thyroid disorder        SURGICAL HISTORY  History reviewed. No pertinent surgical history.    FAMILY HISTORY  History reviewed. No pertinent family history.    SOCIAL HISTORY   reports that she has never smoked. She has never used smokeless tobacco. She reports current alcohol use. She reports that she does not use drugs.    CURRENT MEDICATIONS  Previous Medications    LEVOTHYROXINE (SYNTHROID) 88 MCG TAB    Take 88 mcg by mouth Every morning on  "an empty stomach.    METHOCARBAMOL (ROBAXIN) 750 MG TAB    Take 375-750 mg by mouth every bedtime.    PRAVASTATIN (PRAVACHOL) 20 MG TAB    Take 20 mg by mouth every evening.       ALLERGIES  Patient has no known allergies.    PHYSICAL EXAM  BP (!) 179/87   Pulse 61   Temp 36.3 °C (97.4 °F) (Temporal)   Resp 16   Ht 1.473 m (4' 10\")   Wt 71 kg (156 lb 8.4 oz)   SpO2 96%   BMI 32.71 kg/m²   Nursing note and vitals reviewed.  Constitutional: Well-developed and well-nourished. No acute distress.  HENT: Head is normocephalic and atraumatic.  Eyes: extra-ocular movements intact  Cardiovascular: Regular rate and regular rhythm. No murmur heard.  Pulmonary/Chest: Breath sounds normal. No wheezes or rales.   Abdominal: Soft and non-tender. No distention.    Musculoskeletal: Diffuse tenderness to the left side in the lower back and right side of the neck.  No point tenderness to the midline spine.  Strength is 5 out of 5 in all extremities.  Patient ambulates with a narrow-base steady gait.  Neurological: Awake and alert ,cranial nerves II to XII intact, sensation intact in all extremities, no focal neurologic deficits  Skin: Skin is warm and dry. No rash.     DIAGNOSTIC STUDIES    Radiology:   CT-LSPINE W/O PLUS RECONS   Final Result      No acute fracture or dislocation of the lumbar spine.      CT-TSPINE W/O PLUS RECONS   Final Result      1.  No acute fracture or dislocation of the thoracic spine.   2.  Thoracic spondylosis.      CT-CSPINE WITHOUT PLUS RECONS   Final Result      No acute fracture or dislocation of the cervical spine.      Degenerative changes as detailed.      CT-HEAD W/O   Final Result      1.  No acute intracranial hemorrhage.   2.  Posterior scalp hematoma. No depressed calvarial fracture.                    COURSE & MEDICAL DECISION MAKING     Nursing notes, vital signs, PMSFSHx reviewed in chart     ED Observation Status? No; Patient does not meet criteria for ED Observation.     INITIAL " ASSESSMENT AND PLAN    Escalation of care considered, and ultimately not performed: See below.    Barriers to care at this time, including but not limited to: None.     Diagnostic tests and prescription drugs considered including, but not limited to: See below.    6:57 AM - Patient seen and evaluated at bedside. Updated her on plan of care including imaging. She is understandable and agreeable with the plan of care. She will be treated for pain.    8:55 AM - Patient was reevaluated at bedside. Discussed radiology results with the patient. Patient is still feeling pain.  Patient will be treated for pain.    9:23 AM - Patient was reevaluated at bedside. Patient is feeling improved. Updated her on the plan for discharge. Discussed discharge instructions and return precautions with the patient and they were cleared for discharge. Patient was given the opportunity to ask any further questions. She is comfortable with discharge at this time.       FINAL PROBLEM LIST AND DISPOSITION    In addition to the chief complaint, the following problems were addressed: none    I have discussed management of the patient with the following physicians and ISABELLA's:  none    Discussion of management with other QHP or appropriate source(s): none     DISCUSSION  Patient is a 67-year-old female who presents for evaluation of headache and back pain after a fall.  Differential diagnosis includes closed head injury, intracranial hemorrhage, musculoskeletal pain, spinal injury.  Diagnostic work-up includes CT of the head and spine.    Patient's initial vitals notable for hypertension likely secondary to pain.  She is initially treated with Flexeril with some improvement in her symptoms.  CT imaging returns and demonstrates no acute traumatic injury except for posterior scalp hematoma.  Therefore she is further treated with Toradol after which she feels improved.  Patient is reassured and advised on management of pain after fall.  She is provided  prescription for Flexeril and given strict return precautions.  Patient is then discharged in stable condition.    Patient will be discharged home.    FOLLOW UP:  Crystal Sanchez P.A.-C.  [address] [place] [address]  [place] [address]  [phone]            OUTPATIENT MEDICATIONS:  New Prescriptions    CYCLOBENZAPRINE (FLEXERIL) 10 MG TAB    Take 1 Tablet by mouth 3 times a day as needed for Mild Pain, Moderate Pain or Muscle Spasms.       FINAL IMPRESSION   1. Fall, initial encounter    2. Closed head injury, initial encounter    3. Acute low back pain without sciatica, unspecified back pain laterality             The note accurately reflects work and decisions made by me.  Lauren Koehler M.D.  1/2/2023  2:54 PM     Micha MARKS (Scribe), am scribing for, and in the presence of, Lauren Koehler M.D..    Electronically signed by: Micha Dias (Fionaibe), 1/2/2023    Lauren MARKS M.D. personally performed the services described in this documentation, as scribed by Micha Dias in my presence, and it is both accurate and complete.    C

## 2023-01-02 NOTE — LETTER
"  FORM C-4:  EMPLOYEE’S CLAIM FOR COMPENSATION/ REPORT OF INITIAL TREATMENT  EMPLOYEE’S CLAIM - PROVIDE ALL INFORMATION REQUESTED   First Name Karen Last Name Chelsea Birthdate 1955  Sex female Claim Number   Home Address [address] [facility]             [address] [address]                                   Age  67 y.o. Height  1.473 m (4' 10\") Weight  71 kg (156 lb 8.4 oz) [facility]  [id number]   Mailing Address [address]  [facility]              Zip [address] Telephone  [phone] (home)  Primary Language Spoken   Insurer  *** Third Party   [facility] Employee's Occupation (Job Title) When Injury or Occupational Disease Occurred     Employer's Name [employer] Telephone [phone]    Employer Address [address] [facility] [address]] Zip [address]   Date of Injury         Hour of Injury   Date Employer Notified   Last Day of Work after Injury or Occupational Disease   Supervisor to Whom Injury Reported     Address or Location of Accident (if applicable)    What were you doing at the time of accident? (if applicable)     How did this injury or occupational disease occur? Be specific and answer in detail. Use additional sheet if necessary)     If you believe that you have an occupational disease, when did you first have knowledge of the disability and it relationship to your employment?  Witnesses to the Accident     Nature of Injury or Occupational Disease   Part(s) of Body Injured or Affected  , ,     I CERTIFY THAT THE ABOVE IS TRUE AND CORRECT TO THE BEST OF MY KNOWLEDGE AND THAT I HAVE PROVIDED THIS INFORMATION IN ORDER TO OBTAIN THE BENEF[place] CHAPTER 617 OF [place]).  I HEREBY AUTHORIZE ANY PHYSICIAN, CHIROPRACTOR, SURGEON, PRACTITIONER, OR OTHER PERSON, ANY HOSPITAL, INCLUDING [facility] OR [facility], ANY MEDICAL SERVICE " ORGANIZATION, ANY INSURANCE COMPANY, OR OTHER INSTITUTION OR ORGANIZATION TO RELEASE TO EACH OTHER, ANY MEDICAL OR OTHER INFORMATION, INCLUDING BENEFITS PAID OR PAYABLE, PERTINENT TO THIS INJURY OR DISEASE, EXCEPT INFORMATION RELATIVE TO DIAGNOSIS, TREATMENT AND/OR COUNSELING FOR AIDS, PSYCHOLOGICAL CONDITIONS, ALCOHOL OR CONTROLLED SUBSTANCES, FOR WHICH I MUST GIVE SPECIFIC AUTHORIZATION.  A PHOTOSTAT OF THIS AUTHORIZATION SHALL BE AS VALID AS THE ORIGINAL.  Date                                      Place                                                                             Employee’s Signature   THIS REPORT MUST BE COMPLETED AND MAILED WITHIN 3 WORKING DAYS OF TREATMENT   Place [facility], EMERGENCY DEPT                       Name of Facility [facility]   Date  1/2/2023 Diagnosis  (W19.XXXA) Fall, initial encounter  (S09.90XA) Closed head injury, initial encounter  (M54.50) Acute low back pain without sciatica, unspecified back pain laterality Is there evidence the injured employee was under the influence of alcohol and/or another controlled substance at the time of accident?   Hour  10:38 AM Description of Injury or Disease  Fall, initial encounter  Closed head injury, initial encounter  Acute low back pain without sciatica, unspecified back pain laterality     Treatment     Have you advised the patient to remain off work five days or more?             X-Ray Findings    If Yes   From Date    To Date      From information given by the employee, together with medical evidence, can you directly connect this injury or occupational disease as job incurred?   If No, is employee capable of: Full Duty    Modified Duty      Is additional medical care by a physician indicated?   If Modified Duty, Specify any Limitations / Restrictions       Do you know of any previous injury or disease contributing to this condition or occupational disease?      Date 1/20/2023 Print Doctor’s  "Name Lauren Koehler SELINA certify the employer’s copy of this form was mailed on:   Address [address] [address] [phone] INSURER’S USE ONLY   Provider’s Tax ID Number   Telephone Dept: [phone]    Doctor’s Signature   Degree        Form C-4 (rev.10/07)                                                                         BRIEF DESCRIPTION OF RIGHTS AND BENEFITS  ([place]ursuant to [place] 616C.050)    Notice of Injury or Occupational Disease (Incident Report Form C-1): If an injury or occupational disease (OD) arises out of and in the course of employment, you must provide written notice to your employer as soon as practicable, but no later than 7 days after the accident or OD. Your employer shall maintain a sufficient supply of the required forms.    Claim for Compensation (Form C-4): If medical treatment is sought, the form C-4 is available at the place of initial treatment. A completed \"Claim for Compensation\" (Form C-4) must be filed within 90 days after an accident or OD. The treating physician or chiropractor must, within 3 working days after treatment, complete and mail to the employer, the employer's insurer and third-party , the Claim for Compensation.    Medical Treatment: If you require medical treatment for your on-the-job injury or OD, you may be required to select a physician or chiropractor from a list provided by your workers’ compensation insurer, if it has contracted with an Organization for Managed Care (MCO) or Preferred Provider Organization (PPO) or providers of health care. If your employer has not entered into a contract with an MCO or PPO, you may select a physician or chiropractor from the Panel of Physicians and Chiropractors. Any medical costs related to your industrial injury or OD will be paid by your insurer.    Temporary Total Disability (TTD): If your doctor has certified that you are unable to work for a period of at least 5 consecutive days, or 5 " cumulative days in a 20-day period, or places restrictions on you that your employer does not accommodate, you may be entitled to TTD compensation.    Temporary Partial Disability (TPD): If the wage you receive upon reemployment is less than the compensation for TTD to which you are entitled, the insurer may be required to pay you TPD compensation to make up the difference. TPD can only be paid for a maximum of 24 months.    Permanent Partial Disability (PPD): When your medical condition is stable and there is an indication of a PPD as a result of your injury or OD, within 30 days, your insurer must arrange for an evaluation by a rating physician or chiropractor to determine the degree of your PPD. The amount of your PPD award depends on the date of injury, the results of the PPD evaluation, your age and wage.    Permanent Total Disability (PTD): If you are medically certified by a treating physician or chiropractor as permanently and totally disabled and have been granted a PTD status by your insurer, you are entitled to receive monthly benefits not to exceed 66 2/3% of your average monthly wage. The amount of your PTD payments is subject to reduction if you previously received a lump-sum PPD award.    Vocational Rehabilitation Services: You may be eligible for vocational rehabilitation services if you are unable to return to the job due to a permanent physical impairment or permanent restrictions as a result of your injury or occupational disease.    Transportation and Per Cliff Reimbursement: You may be eligible for travel expenses and per cliff associated with medical treatment.    Reopening: You may be able to reopen your claim if your condition worsens after claim closure.     Appeal Process: If you disagree with a written determination issued by the insurer or the insurer does not respond to your request, you may appeal to the Department of Administration, , by following the instructions  contained in your determination letter. You must appeal the determination within 70 days from the date of the determination letter at [address], [address], [place], [place] [address], or [address], [address], [place], [place] [address]. If you disagree with the  decision, you may appeal to the Department of Administration, . You must file your appeal within 30 days from the date of the  decision letter at [address], [address], [place], [place] [address], or [address], [address], [place], [place] [address]. If you disagree with a decision of an , you may file a petition for judicial review with the District Court. You must do so within 30 days of the Appeal Officer’s decision. You may be represented by an  at your own expense or you may contact the [facility] for possible representation.    [place]  for Injured Workers (NAIW): If you disagree with a  decision, you may request that NAIW represent you without charge at an  Hearing. For information regarding denial of benefits, you may contact the [facility] at: [address], [address], [place] [address], [phone], or [address], [address], [place] [address], [phone]    To File a Complaint with the Division: If you wish to file a complaint with the  of the Division of Industrial Relations (DIR),  please contact the Workers’ Compensation Section, [address], [address], [place], [place] [address], telephone [phone], or [address], [address], [place], [place] [address], telephone [phone].    For assistance with Workers’ Compensation Issues: You may contact the [facility] Office for Consumer Health Assistance, [address], [address], [place], [place] [address], Toll Free 1-799.153.6921, Web site: http://[facility].[place].gov/Programs/LORE E-mail:  linnette@govcha.[place].gov  D-2 (rev. 10/20)              __________________________________________________________________                                    _________________            Employee Name / Signature                                                                                                                            Date

## 2023-01-02 NOTE — LETTER
"  FORM C-4:  EMPLOYEE’S CLAIM FOR COMPENSATION/ REPORT OF INITIAL TREATMENT  EMPLOYEE’S CLAIM - PROVIDE ALL INFORMATION REQUESTED   First Name Karen Last Name Chelsea Birthdate 1955  Sex female Claim Number   Home Address [address]   [facility]             [place] [address]                                   Age  67 y.o. Height  1.473 m (4' 10\") Weight  71 kg (156 lb 8.4 oz) [facility]  [id number]   Mailing Address [address]  [facility]              Zip [address] Telephone  [phone] (home)  Primary Language Spoken   Insurer  *** Third Party   [facility] Employee's Occupation (Job Title) When Injury or Occupational Disease Occurred     Employer's Name [employer] Telephone [phone]    Employer Address [address]] Zip [address]   Date of Injury  1/2/2023       Hour of Injury  5:30 AM Date Employer Notified  1/9/2023 Last Day of Work after Injury or Occupational Disease  12/30/2022 Supervisor to Whom Injury Reported  Aaron Yan   Address or Location of Accident (if applicable) Work [1]   What were you doing at the time of accident? (if applicable) [employer]    How did this injury or occupational disease occur? Be specific and answer in detail. Use additional sheet if necessary)  me Resvaley ocAi de espalda y me di en la Billy   If you believe that you have an occupational disease, when did you first have knowledge of the disability and it relationship to your employment? N/A Witnesses to the Accident  NO persoNA de NANTINEIMIENTO   Nature of Injury or Occupational Disease  Workers' Compensation Part(s) of Body Injured or Affected  Skull, Upper Back Area (Thoracic Area), N/A    I CERTIFY THAT THE ABOVE IS TRUE AND CORRECT TO THE BEST OF MY KNOWLEDGE AND THAT I HAVE PROVIDED THIS INFORMATION IN ORDER TO OBTAIN THE BENEF[place], " [place]IVE [place] 617 [place]).  I HEREBY AUTHORIZE ANY PHYSICIAN, CHIROPRACTOR, SURGEON, PRACTITIONER, OR OTHER PERSON, ANY HOSPITAL, INCLUDING [facility] OR [facility] H[facility]PITAL, ANY MEDICAL SERVICE ORGANIZATION, ANY INSURANCE COMPANY, OR OTHER INSTITUTION OR ORGANIZATION TO RELEASE TO EACH OTHER, ANY MEDICAL OR OTHER INFORMATION, INCLUDING BENEFITS PAID OR PAYABLE, PERTINENT TO THIS INJURY OR DISEASE, EXCEPT INFORMATION RELATIVE TO DIAGNOSIS, TREATMENT AND/OR COUNSELING FOR AIDS, PSYCHOLOGICAL CONDITIONS, ALCOHOL OR CONTROLLED SUBSTANCES, FOR WHICH I MUST GIVE SPECIFIC AUTHORIZATION.  A PHOTOSTAT OF THIS AUTHORIZATION SHALL BE AS VALID AS THE ORIGINAL.  Date                                      Place                                                                             Employee’s Signature   THIS REPORT MUST BE COMPLETED AND MAILED WITHIN 3 WORKING DAYS OF TREATMENT   Place [facility], EMERGENCY DEPT                       Name of Facility [facility]   Date  1/2/2023 Diagnosis  (W19.XXXA) Fall, initial encounter  (S09.90XA) Closed head injury, initial encounter  (M54.50) Acute low back pain without sciatica, unspecified back pain laterality Is there evidence the injured employee was under the influence of alcohol and/or another controlled substance at the time of accident?   Hour  10:57 AM Description of Injury or Disease  Fall, initial encounter  Closed head injury, initial encounter  Acute low back pain without sciatica, unspecified back pain laterality     Treatment     Have you advised the patient to remain off work five days or more?             X-Ray Findings    If Yes   From Date    To Date      From information given by the employee, together with medical evidence, can you directly connect this injury or occupational disease as job incurred?   If No, is employee capable of: Full Duty    Modified Duty      Is additional medical care by a physician  "indicated?   If Modified Duty, Specify any Limitations / Restrictions       Do you know of any previous injury or disease contributing to this condition or occupational disease?      Date 1/21/2023 Print Doctor’s Name Rodo Lauren RAMIREZ SELINA certify the employer’s copy of this form was mailed on:   Address [address] [place] [address]  [place] [id number] [phone] INSURER’S USE ONLY   Provider’s Tax ID Number   Telephone Dept: [phone]    Doctor’s Signature   Degree        Form C-4 (rev.10/07)                                                                         BRIEF DESCRIPTION OF RIGHTS AND BENEFITS  ([place] to [place] 616C.050)    Notice of Injury or Occupational Disease (Incident Report Form C-1): If an injury or occupational disease (OD) arises out of and in the course of employment, you must provide written notice to your employer as soon as practicable, but no later than 7 days after the accident or OD. Your employer shall maintain a sufficient supply of the required forms.    Claim for Compensation (Form C-4): If medical treatment is sought, the form C-4 is available at the place of initial treatment. A completed \"Claim for Compensation\" (Form C-4) must be filed within 90 days after an accident or OD. The treating physician or chiropractor must, within 3 working days after treatment, complete and mail to the employer, the employer's insurer and third-party , the Claim for Compensation.    Medical Treatment: If you require medical treatment for your on-the-job injury or OD, you may be required to select a physician or chiropractor from a list provided by your workers’ compensation insurer, if it has contracted with an Organization for Managed Care (MCO) or Preferred Provider Organization (PPO) or providers of health care. If your employer has not entered into a contract with an MCO or PPO, you may select a physician or chiropractor from the Panel of Physicians and Chiropractors. Any medical costs related " to your industrial injury or OD will be paid by your insurer.    Temporary Total Disability (TTD): If your doctor has certified that you are unable to work for a period of at least 5 consecutive days, or 5 cumulative days in a 20-day period, or places restrictions on you that your employer does not accommodate, you may be entitled to TTD compensation.    Temporary Partial Disability (TPD): If the wage you receive upon reemployment is less than the compensation for TTD to which you are entitled, the insurer may be required to pay you TPD compensation to make up the difference. TPD can only be paid for a maximum of 24 months.    Permanent Partial Disability (PPD): When your medical condition is stable and there is an indication of a PPD as a result of your injury or OD, within 30 days, your insurer must arrange for an evaluation by a rating physician or chiropractor to determine the degree of your PPD. The amount of your PPD award depends on the date of injury, the results of the PPD evaluation, your age and wage.    Permanent Total Disability (PTD): If you are medically certified by a treating physician or chiropractor as permanently and totally disabled and have been granted a PTD status by your insurer, you are entitled to receive monthly benefits not to exceed 66 2/3% of your average monthly wage. The amount of your PTD payments is subject to reduction if you previously received a lump-sum PPD award.    Vocational Rehabilitation Services: You may be eligible for vocational rehabilitation services if you are unable to return to the job due to a permanent physical impairment or permanent restrictions as a result of your injury or occupational disease.    Transportation and Per Cliff Reimbursement: You may be eligible for travel expenses and per cliff associated with medical treatment.    Reopening: You may be able to reopen your claim if your condition worsens after claim closure.     Appeal Process: If you disagree  with a written determination issued by the insurer or the insurer does not respond to your request, you may appeal to the Department of Administration, , by following the instructions contained in your determination letter. You must appeal the determination within 70 days from the date of the determination letter at [address], [address], [place], [place] [address], or [address], [place], [place] [address]. If you disagree with the  decision, you may appeal to the Department of Administration, . You must file your appeal within 30 days from the date of the  decision letter at [address], [address], [place], [place] [address], or [address], [address], [place], [place] [address]. If you disagree with a decision of an , you may file a petition for judicial review with the District Court. You must do so within 30 days of the Appeal Officer’s decision. You may be represented by an  at your own expense or you may contact the [facility] for possible representation.    [place]  for In[place] Workers ([place]): If you disagree with a  decision, you may request that NAIW represent you without charge at an  Hearing. For information regarding denial of benefits, you may contact the [facility] at: [address], [address], [place] [address], [phone], or [address], [address], [place] [address], [phone]    To File a Complaint with the Division: If you wish to file a complaint with the  of the Division of Industrial Relations (DIR),  please contact the Workers’ Compensation Section, [address], [place], [place] [address], telephone [phone]) [phone], or [address], [address], [place], [place] [address], telephone [phone].    For assistance with Workers’ Compensation Issues: You may contact the  [facility] Office for Consumer Health Assistance, [address], [address], [place], [place] [address], Toll Free 1-594.241.6178, Web site: http://[facility].[place].gov/Programs/LORE E-mail: lore@[facility].nv.gov  D-2 (rev. 10/20)              __________________________________________________________________                                    _________________            Employee Name / Signature                                                                                                                            Date

## 2023-01-02 NOTE — ED TRIAGE NOTES
Chief Complaint   Patient presents with    Fall     Slipped and fell on ice hitting back of head, no LOC, no thinners, contusion to posterior scalp

## 2023-01-20 NOTE — DISCHARGE PLANNING
Note:  Received call from [employer] that pt is here asking for a work note. Per chart review, work letter was printed on 1/2/2023, RN CM reprinted work note. RN CM spoke to  pt in the lobby. Per pt, she does not need a work note. Pt brought paperwork regarding workers comp. Directed pt to PAR.

## 2023-04-03 ENCOUNTER — APPOINTMENT (OUTPATIENT)
Dept: RADIOLOGY | Facility: MEDICAL CENTER | Age: 68
End: 2023-04-03
Attending: STUDENT IN AN ORGANIZED HEALTH CARE EDUCATION/TRAINING PROGRAM
Payer: COMMERCIAL

## 2023-04-03 ENCOUNTER — HOSPITAL ENCOUNTER (EMERGENCY)
Facility: MEDICAL CENTER | Age: 68
End: 2023-04-04
Attending: STUDENT IN AN ORGANIZED HEALTH CARE EDUCATION/TRAINING PROGRAM
Payer: COMMERCIAL

## 2023-04-03 DIAGNOSIS — G43.909 MIGRAINE WITHOUT STATUS MIGRAINOSUS, NOT INTRACTABLE, UNSPECIFIED MIGRAINE TYPE: ICD-10-CM

## 2023-04-03 DIAGNOSIS — I10 HYPERTENSION, UNSPECIFIED TYPE: ICD-10-CM

## 2023-04-03 PROCEDURE — 99284 EMERGENCY DEPT VISIT MOD MDM: CPT

## 2023-04-03 RX ORDER — DIPHENHYDRAMINE HYDROCHLORIDE 50 MG/ML
25 INJECTION INTRAMUSCULAR; INTRAVENOUS ONCE
Status: COMPLETED | OUTPATIENT
Start: 2023-04-04 | End: 2023-04-04

## 2023-04-03 RX ORDER — DEXAMETHASONE SODIUM PHOSPHATE 4 MG/ML
4 INJECTION, SOLUTION INTRA-ARTICULAR; INTRALESIONAL; INTRAMUSCULAR; INTRAVENOUS; SOFT TISSUE ONCE
Status: COMPLETED | OUTPATIENT
Start: 2023-04-04 | End: 2023-04-04

## 2023-04-03 RX ORDER — PROCHLORPERAZINE EDISYLATE 5 MG/ML
5 INJECTION INTRAMUSCULAR; INTRAVENOUS ONCE
Status: COMPLETED | OUTPATIENT
Start: 2023-04-04 | End: 2023-04-04

## 2023-04-03 RX ORDER — ACETAMINOPHEN 500 MG
1000 TABLET ORAL ONCE
Status: COMPLETED | OUTPATIENT
Start: 2023-04-04 | End: 2023-04-04

## 2023-04-04 VITALS
RESPIRATION RATE: 18 BRPM | SYSTOLIC BLOOD PRESSURE: 165 MMHG | TEMPERATURE: 98.4 F | WEIGHT: 177.03 LBS | HEIGHT: 59 IN | BODY MASS INDEX: 35.69 KG/M2 | HEART RATE: 75 BPM | DIASTOLIC BLOOD PRESSURE: 91 MMHG | OXYGEN SATURATION: 99 %

## 2023-04-04 PROCEDURE — 96375 TX/PRO/DX INJ NEW DRUG ADDON: CPT

## 2023-04-04 PROCEDURE — 700111 HCHG RX REV CODE 636 W/ 250 OVERRIDE (IP): Performed by: STUDENT IN AN ORGANIZED HEALTH CARE EDUCATION/TRAINING PROGRAM

## 2023-04-04 PROCEDURE — A9270 NON-COVERED ITEM OR SERVICE: HCPCS | Performed by: STUDENT IN AN ORGANIZED HEALTH CARE EDUCATION/TRAINING PROGRAM

## 2023-04-04 PROCEDURE — 70450 CT HEAD/BRAIN W/O DYE: CPT

## 2023-04-04 PROCEDURE — 96374 THER/PROPH/DIAG INJ IV PUSH: CPT

## 2023-04-04 PROCEDURE — 700102 HCHG RX REV CODE 250 W/ 637 OVERRIDE(OP): Performed by: STUDENT IN AN ORGANIZED HEALTH CARE EDUCATION/TRAINING PROGRAM

## 2023-04-04 RX ORDER — PROCHLORPERAZINE MALEATE 10 MG
10 TABLET ORAL EVERY 6 HOURS PRN
Qty: 20 TABLET | Refills: 0 | Status: SHIPPED | OUTPATIENT
Start: 2023-04-04

## 2023-04-04 RX ORDER — ACETAMINOPHEN 500 MG
500-1000 TABLET ORAL EVERY 6 HOURS PRN
Qty: 30 TABLET | Refills: 0 | Status: SHIPPED | OUTPATIENT
Start: 2023-04-04

## 2023-04-04 RX ORDER — IBUPROFEN 600 MG/1
600 TABLET ORAL EVERY 6 HOURS PRN
Qty: 30 TABLET | Refills: 0 | Status: SHIPPED | OUTPATIENT
Start: 2023-04-04

## 2023-04-04 RX ORDER — KETOROLAC TROMETHAMINE 30 MG/ML
15 INJECTION, SOLUTION INTRAMUSCULAR; INTRAVENOUS ONCE
Status: COMPLETED | OUTPATIENT
Start: 2023-04-04 | End: 2023-04-04

## 2023-04-04 RX ADMIN — ACETAMINOPHEN 1000 MG: 500 TABLET ORAL at 00:00

## 2023-04-04 RX ADMIN — DIPHENHYDRAMINE HYDROCHLORIDE 25 MG: 50 INJECTION INTRAMUSCULAR; INTRAVENOUS at 00:00

## 2023-04-04 RX ADMIN — KETOROLAC TROMETHAMINE 15 MG: 30 INJECTION, SOLUTION INTRAMUSCULAR at 01:30

## 2023-04-04 RX ADMIN — DEXAMETHASONE SODIUM PHOSPHATE 4 MG: 4 INJECTION, SOLUTION INTRA-ARTICULAR; INTRALESIONAL; INTRAMUSCULAR; INTRAVENOUS; SOFT TISSUE at 00:00

## 2023-04-04 RX ADMIN — PROCHLORPERAZINE EDISYLATE 5 MG: 5 INJECTION INTRAMUSCULAR; INTRAVENOUS at 00:01

## 2023-04-04 ASSESSMENT — PAIN DESCRIPTION - PAIN TYPE: TYPE: ACUTE PAIN

## 2023-04-04 NOTE — ED PROVIDER NOTES
ED Provider Note    CHIEF COMPLAINT  Chief Complaint   Patient presents with    Headache     Pressure headache for a couple of days.   Pt reports high bp reading of 140/101 today 30 minutes ago.        EXTERNAL RECORDS REVIEWED  Reviewed patient's most recent ED visit in January was evaluated after a mechanical fall had imaging at that point which was negative.  Reviewed imaging from that visit as below  CT-LSPINE W/O PLUS RECONS   Final Result       No acute fracture or dislocation of the lumbar spine.       CT-TSPINE W/O PLUS RECONS   Final Result       1.  No acute fracture or dislocation of the thoracic spine.   2.  Thoracic spondylosis.       CT-CSPINE WITHOUT PLUS RECONS   Final Result       No acute fracture or dislocation of the cervical spine.       Degenerative changes as detailed.       CT-HEAD W/O   Final Result       1.  No acute intracranial hemorrhage.   2.  Posterior scalp hematoma. No depressed calvarial fracture.             HPI/ROS  LIMITATION TO HISTORY   Select: Language Cymraes,  Used   OUTSIDE HISTORIAN(S):  Family son reports that the patient's blood pressure was in the 140/90 systolic range    Karen Tran is a 67 y.o. female who presents patient presents for evaluation of a headache and elevated blood pressure readings.  For the past 3 days she has had a throbbing frontal headache that radiates to the back.  States that it started after driving home from [place] in the snow, does have associated photophobia, denies any numbness tingling weakness in any extremities.  The headache was not sudden onset nor the worst of her life no associated neck stiffness numbness tingling weakness in extremities no trauma no falls denies any blood thinners or antiplatelet medications.  Patient has been trying over-the-counter Aleve without relief of her symptoms prompting her ER visit this evening.  Has an additional complaint of elevated blood pressure readings with BPs running in  "the 140/90 systolic at home.  She does have a known history of hypertension.  Denies any chest pain shortness of breath abdominal pain nausea or vomiting, and she has been compliant with her antihypertensive therapy  PAST MEDICAL HISTORY   has a past medical history of Thyroid disorder.    SURGICAL HISTORY  patient denies any surgical history    FAMILY HISTORY  History reviewed. No pertinent family history.    SOCIAL HISTORY  Social History     Tobacco Use    Smoking status: Never    Smokeless tobacco: Never   Vaping Use    Vaping Use: Never used   Substance and Sexual Activity    Alcohol use: Yes     Comment: occasional    Drug use: No    Sexual activity: Not on file       CURRENT MEDICATIONS  Home Medications       Reviewed by Alondra Saravia R.N. (Registered Nurse) on 04/03/23 at 2339  Med List Status: Partial     Medication Last Dose Status   cyclobenzaprine (FLEXERIL) 10 mg Tab  Active   levothyroxine (SYNTHROID) 88 MCG Tab  Active   methocarbamol (ROBAXIN) 750 MG Tab  Active   pravastatin (PRAVACHOL) 20 MG Tab  Active                    ALLERGIES  No Known Allergies    PHYSICAL EXAM  VITAL SIGNS: BP (!) 145/96   Pulse 68   Temp 36.2 °C (97.2 °F) (Temporal)   Resp 18   Ht 1.499 m (4' 11\")   Wt 80.3 kg (177 lb 0.5 oz)   SpO2 95%   BMI 35.76 kg/m²    Pulse ox interpretation: I interpret this pulse ox as normal.  VITALS - vital signs documented prior to this note have been reviewed and noted,  GENERAL - awake, alert, oriented, GCS 15, no apparent distress, non-toxic  appearing  HEENT - normocephalic, atraumatic, pupils equal, sclera anicteric, mucus  membranes moist  NECK - supple, no meningismus, full active range of motion, trachea midline  CARDIOVASCULAR - regular rate/rhythm, no murmurs/gallops/rubs  PULMONARY - no respiratory distress, speaking in full sentences, clear to  auscultation bilaterally, no wheezing/ronchi/rales, no accessory muscle use  GASTROINTESTINAL - soft, non-tender, " non-distended, no rebound, guarding,  or peritonitis  GENITOURINARY - Deferred  NEUROLOGIC - Awake alert, normal mental status, speech fluid, cognition  normal, moves all extremities  MUSCULOSKELETAL - no obvious asymmetry or deformities present  EXTREMITIES - warm, well-perfused, no cyanosis or significant edema  DERMATOLOGIC - warm, dry, no rashes, no jaundice  PSYCHIATRIC - normal affect, normal insight, normal concentration      DIAGNOSTIC STUDIES / PROCEDURES      RADIOLOGY  I have independently interpreted the diagnostic imaging associated with this visit and am waiting the final reading from the radiologist.   My preliminary interpretation is as follows: negative for bleed  Radiologist interpretation:   CT-HEAD W/O   Final Result         1.  No acute intracranial abnormality.              COURSE & MEDICAL DECISION MAKING    ED Observation Status? No; Patient does not meet criteria for ED Observation.           INITIAL ASSESSMENT, COURSE AND PLAN  Care Narrative:      The patient presented to the emergency department with a headache and elevated blood pressure readings.  Differential initially included was not limited to tension-type headache migraine-type headache cluster type headache less likely temporal arteritis pseudotumor cerebri intracranial mass intracranial bleed meningitis encephalitis subarachnoid hemorrhage among other considerations given the patient's age CT head was obtained  which fortunately was negative for acute pathology.  She was treated with Benadryl Tylenol Compazine as well as a dose of Decadron after treatment the patient is now resting comfortably and feels better.  Her neurologic exam in the emergency department is unremarkable.  Blood pressure is elevated though no signs of hypertensive urgency or emergency this evening thus will defer additional labs and imaging.  Blood pressure did improve after treatment of her headache may have been elevated secondary to pain.  History and  physical exam seems consistent with a migraine type headache.  Pain is controlled that status migrainous thus I believe the patient is appropriate for outpatient management.  The vital signs have otherwise been stable.  Recommended she follow-up with her primary care physician or return with any other new or worsening symptoms.  Patient did feel comfortable this plan was discharged in stable condition          HTN/IDDM FOLLOW UP:  The patient has known hypertension and is being followed by their primary care doctor      ADDITIONAL PROBLEM LIST  Elevated blood pressure reading outpatient follow-up  DISPOSITION AND DISCUSSIONS  I have discussed management of the patient with the following physicians and ISABELLA's:  NONE    Discussion of management with other QHP or appropriate source(s): None     Escalation of care considered, and ultimately not performed:IV fluids and blood analysis    Barriers to care at this time, including but not limited to:  none .     Decision tools and prescription drugs considered including, but not limited to: Medication modification   .    FINAL DIAGNOSIS  1. Migraine without status migrainosus, not intractable, unspecified migraine type    2. Hypertension, unspecified type    3. BMI 35       Electronically signed by: Wilfredo Mai D.O., 4/3/2023 11:56 PM

## 2023-04-04 NOTE — ED NOTES
IV placed at bedside. Pt medicated per MAR. Pt ambulating to bathroom and back without assistance.

## 2023-04-04 NOTE — ED TRIAGE NOTES
"Chief Complaint   Patient presents with    Headache     Pressure headache for a couple of days.   Pt reports high bp reading of 140/101 today 30 minutes ago.      BP (!) 145/96   Pulse 68   Temp 36.2 °C (97.2 °F) (Temporal)   Resp 18   Ht 1.499 m (4' 11\")   Wt 80.3 kg (177 lb 0.5 oz)   SpO2 95%   BMI 35.76 kg/m²     "

## 2023-04-04 NOTE — ED NOTES
Patient is stable for discharge at this time, anticipatory guidance provided, close follow-up is encouraged, and ED return instructions have been detailed. Patient is both agreeable to the disposition and plan and discharged home in ambulatory state and in good condition.      Rx education provided, Pt verbalized understanding;  Medication information and compliance explained at bedside.

## 2024-06-28 ENCOUNTER — OFFICE VISIT (OUTPATIENT)
Dept: URGENT CARE | Facility: PHYSICIAN GROUP | Age: 69
End: 2024-06-28
Payer: COMMERCIAL

## 2024-06-28 ENCOUNTER — HOSPITAL ENCOUNTER (OUTPATIENT)
Dept: RADIOLOGY | Facility: MEDICAL CENTER | Age: 69
End: 2024-06-28
Attending: FAMILY MEDICINE
Payer: COMMERCIAL

## 2024-06-28 VITALS
RESPIRATION RATE: 16 BRPM | SYSTOLIC BLOOD PRESSURE: 136 MMHG | BODY MASS INDEX: 32.57 KG/M2 | WEIGHT: 177 LBS | HEIGHT: 62 IN | DIASTOLIC BLOOD PRESSURE: 84 MMHG | TEMPERATURE: 97.6 F | OXYGEN SATURATION: 96 % | HEART RATE: 60 BPM

## 2024-06-28 DIAGNOSIS — M25.511 ACUTE PAIN OF RIGHT SHOULDER: ICD-10-CM

## 2024-06-28 PROCEDURE — 73030 X-RAY EXAM OF SHOULDER: CPT | Mod: RT

## 2024-06-28 RX ORDER — ATORVASTATIN CALCIUM 40 MG/1
40 TABLET, FILM COATED ORAL DAILY
COMMUNITY
Start: 2024-06-07

## 2024-06-28 RX ORDER — NAPROXEN 500 MG/1
500 TABLET ORAL 2 TIMES DAILY WITH MEALS
Qty: 60 TABLET | Refills: 0 | Status: SHIPPED | OUTPATIENT
Start: 2024-06-28

## 2024-06-28 RX ORDER — SUMATRIPTAN 50 MG/1
50 TABLET, FILM COATED ORAL
COMMUNITY
Start: 2024-05-13

## 2024-06-28 ASSESSMENT — ENCOUNTER SYMPTOMS
EYES NEGATIVE: 1
GASTROINTESTINAL NEGATIVE: 1
RESPIRATORY NEGATIVE: 1
CONSTITUTIONAL NEGATIVE: 1
CARDIOVASCULAR NEGATIVE: 1

## 2024-06-28 NOTE — PROGRESS NOTES
"Subjective:   Karen Tran is a 68 y.o. female who presents for Arm Pain (Right arm pain ,upper area and weakness x 8 days )      8 days of right shoulder pain radiating down to the elbow.  Has hand weakness for a long time, unable to make a fist.  She states no injury.    Arm Pain         Review of Systems   Constitutional: Negative.    HENT: Negative.     Eyes: Negative.    Respiratory: Negative.     Cardiovascular: Negative.    Gastrointestinal: Negative.    Genitourinary: Negative.    Musculoskeletal:         Right arm pain   Skin: Negative.        Medications, Allergies, and current problem list reviewed today in Epic.     Objective:     /84   Pulse 60   Temp 36.4 °C (97.6 °F) (Temporal)   Resp 16   Ht 1.575 m (5' 2\")   Wt 80.3 kg (177 lb)   SpO2 96%     Physical Exam  Vitals and nursing note reviewed.   Constitutional:       Appearance: Normal appearance.   Cardiovascular:      Rate and Rhythm: Normal rate and regular rhythm.      Pulses: Normal pulses.      Heart sounds: Normal heart sounds.   Pulmonary:      Effort: Pulmonary effort is normal.      Breath sounds: Normal breath sounds.   Musculoskeletal:      Comments: Shoulder pain at deltoid, radiates down to the right elbow, decrease strength, decrease rom.  Unable to make a fist though her  is good except the middle and index finger   Neurological:      Mental Status: She is alert.         Assessment/Plan:     Diagnosis and associated orders:     1. Acute pain of right shoulder  DX-SHOULDER 2+ RIGHT         Comments/MDM:     No fx seen  Follow up with primary for continued investigation         Differential diagnosis, natural history, supportive care, and indications for immediate follow-up discussed.    Advised the patient to follow-up with the primary care physician for recheck, reevaluation, and consideration of further management.    Please note that this dictation was created using voice recognition software. I have made a " reasonable attempt to correct obvious errors, but I expect that there are errors of grammar and possibly content that I did not discover before finalizing the note.    This note was electronically signed by Jesus Oneal M.D.

## 2025-06-22 ENCOUNTER — HOSPITAL ENCOUNTER (EMERGENCY)
Facility: MEDICAL CENTER | Age: 70
End: 2025-06-23
Attending: EMERGENCY MEDICINE
Payer: COMMERCIAL

## 2025-06-22 ENCOUNTER — APPOINTMENT (OUTPATIENT)
Dept: RADIOLOGY | Facility: MEDICAL CENTER | Age: 70
End: 2025-06-22
Attending: EMERGENCY MEDICINE
Payer: COMMERCIAL

## 2025-06-22 ENCOUNTER — OFFICE VISIT (OUTPATIENT)
Dept: URGENT CARE | Facility: PHYSICIAN GROUP | Age: 70
End: 2025-06-22
Payer: COMMERCIAL

## 2025-06-22 VITALS
OXYGEN SATURATION: 96 % | HEIGHT: 57 IN | WEIGHT: 167.55 LBS | HEART RATE: 76 BPM | BODY MASS INDEX: 36.15 KG/M2 | RESPIRATION RATE: 16 BRPM | TEMPERATURE: 98.4 F | SYSTOLIC BLOOD PRESSURE: 120 MMHG | DIASTOLIC BLOOD PRESSURE: 70 MMHG

## 2025-06-22 DIAGNOSIS — R10.84 GENERALIZED ABDOMINAL PAIN: Primary | ICD-10-CM

## 2025-06-22 DIAGNOSIS — R10.30 LOWER ABDOMINAL PAIN: Primary | ICD-10-CM

## 2025-06-22 DIAGNOSIS — N39.0 ACUTE UTI: ICD-10-CM

## 2025-06-22 DIAGNOSIS — R31.9 HEMATURIA OF UNKNOWN CAUSE: ICD-10-CM

## 2025-06-22 DIAGNOSIS — R39.15 URGENCY OF URINATION: ICD-10-CM

## 2025-06-22 LAB
ALBUMIN SERPL BCP-MCNC: 4.1 G/DL (ref 3.2–4.9)
ALBUMIN/GLOB SERPL: 1.2 G/DL
ALP SERPL-CCNC: 149 U/L (ref 30–99)
ALT SERPL-CCNC: 18 U/L (ref 2–50)
ANION GAP SERPL CALC-SCNC: 11 MMOL/L (ref 7–16)
APPEARANCE UR: CLEAR
APPEARANCE UR: CLEAR
AST SERPL-CCNC: 20 U/L (ref 12–45)
BACTERIA #/AREA URNS HPF: ABNORMAL /HPF
BASOPHILS # BLD AUTO: 0.6 % (ref 0–1.8)
BASOPHILS # BLD: 0.04 K/UL (ref 0–0.12)
BILIRUB SERPL-MCNC: 0.3 MG/DL (ref 0.1–1.5)
BILIRUB UR QL STRIP.AUTO: NEGATIVE
BILIRUB UR STRIP-MCNC: NEGATIVE MG/DL
BUN SERPL-MCNC: 13 MG/DL (ref 8–22)
CALCIUM ALBUM COR SERPL-MCNC: 8.8 MG/DL (ref 8.5–10.5)
CALCIUM SERPL-MCNC: 8.9 MG/DL (ref 8.5–10.5)
CASTS URNS QL MICRO: ABNORMAL /LPF (ref 0–2)
CHLORIDE SERPL-SCNC: 107 MMOL/L (ref 96–112)
CO2 SERPL-SCNC: 23 MMOL/L (ref 20–33)
COLOR UR AUTO: YELLOW
COLOR UR: YELLOW
CREAT SERPL-MCNC: 0.63 MG/DL (ref 0.5–1.4)
EKG IMPRESSION: NORMAL
EOSINOPHIL # BLD AUTO: 0.17 K/UL (ref 0–0.51)
EOSINOPHIL NFR BLD: 2.4 % (ref 0–6.9)
EPITHELIAL CELLS 1715: ABNORMAL /HPF (ref 0–5)
ERYTHROCYTE [DISTWIDTH] IN BLOOD BY AUTOMATED COUNT: 38.1 FL (ref 35.9–50)
GFR SERPLBLD CREATININE-BSD FMLA CKD-EPI: 97 ML/MIN/1.73 M 2
GLOBULIN SER CALC-MCNC: 3.3 G/DL (ref 1.9–3.5)
GLUCOSE SERPL-MCNC: 113 MG/DL (ref 65–99)
GLUCOSE UR STRIP.AUTO-MCNC: NEGATIVE MG/DL
GLUCOSE UR STRIP.AUTO-MCNC: NEGATIVE MG/DL
HCT VFR BLD AUTO: 40.2 % (ref 37–47)
HGB BLD-MCNC: 13.5 G/DL (ref 12–16)
IMM GRANULOCYTES # BLD AUTO: 0.03 K/UL (ref 0–0.11)
IMM GRANULOCYTES NFR BLD AUTO: 0.4 % (ref 0–0.9)
KETONES UR STRIP.AUTO-MCNC: NEGATIVE MG/DL
KETONES UR STRIP.AUTO-MCNC: NEGATIVE MG/DL
LACTATE SERPL-SCNC: 1.3 MMOL/L (ref 0.5–2)
LEUKOCYTE ESTERASE UR QL STRIP.AUTO: ABNORMAL
LEUKOCYTE ESTERASE UR QL STRIP.AUTO: NORMAL
LIPASE SERPL-CCNC: 47 U/L (ref 11–82)
LYMPHOCYTES # BLD AUTO: 2.19 K/UL (ref 1–4.8)
LYMPHOCYTES NFR BLD: 30.3 % (ref 22–41)
MCH RBC QN AUTO: 29.1 PG (ref 27–33)
MCHC RBC AUTO-ENTMCNC: 33.6 G/DL (ref 32.2–35.5)
MCV RBC AUTO: 86.6 FL (ref 81.4–97.8)
MICRO URNS: ABNORMAL
MONOCYTES # BLD AUTO: 0.57 K/UL (ref 0–0.85)
MONOCYTES NFR BLD AUTO: 7.9 % (ref 0–13.4)
NEUTROPHILS # BLD AUTO: 4.23 K/UL (ref 1.82–7.42)
NEUTROPHILS NFR BLD: 58.4 % (ref 44–72)
NITRITE UR QL STRIP.AUTO: NEGATIVE
NITRITE UR QL STRIP.AUTO: NEGATIVE
NRBC # BLD AUTO: 0 K/UL
NRBC BLD-RTO: 0 /100 WBC (ref 0–0.2)
PH UR STRIP.AUTO: 6.5 [PH] (ref 5–8)
PH UR STRIP.AUTO: 6.5 [PH] (ref 5–8)
PLATELET # BLD AUTO: 246 K/UL (ref 164–446)
PMV BLD AUTO: 9.5 FL (ref 9–12.9)
POTASSIUM SERPL-SCNC: 4.1 MMOL/L (ref 3.6–5.5)
PROT SERPL-MCNC: 7.4 G/DL (ref 6–8.2)
PROT UR QL STRIP: NEGATIVE MG/DL
PROT UR QL STRIP: NEGATIVE MG/DL
RBC # BLD AUTO: 4.64 M/UL (ref 4.2–5.4)
RBC # URNS HPF: ABNORMAL /HPF
RBC UR QL AUTO: ABNORMAL
RBC UR QL AUTO: NORMAL
SODIUM SERPL-SCNC: 141 MMOL/L (ref 135–145)
SP GR UR STRIP.AUTO: 1.01
SP GR UR STRIP.AUTO: 1.01
UROBILINOGEN UR STRIP-MCNC: 0.2 MG/DL
UROBILINOGEN UR STRIP.AUTO-MCNC: 1 EU/DL
WBC # BLD AUTO: 7.2 K/UL (ref 4.8–10.8)
WBC #/AREA URNS HPF: ABNORMAL /HPF

## 2025-06-22 PROCEDURE — 93005 ELECTROCARDIOGRAM TRACING: CPT | Mod: TC | Performed by: EMERGENCY MEDICINE

## 2025-06-22 PROCEDURE — 81001 URINALYSIS AUTO W/SCOPE: CPT

## 2025-06-22 PROCEDURE — 80053 COMPREHEN METABOLIC PANEL: CPT

## 2025-06-22 PROCEDURE — 700101 HCHG RX REV CODE 250: Performed by: EMERGENCY MEDICINE

## 2025-06-22 PROCEDURE — 87086 URINE CULTURE/COLONY COUNT: CPT

## 2025-06-22 PROCEDURE — 700117 HCHG RX CONTRAST REV CODE 255: Performed by: EMERGENCY MEDICINE

## 2025-06-22 PROCEDURE — A9270 NON-COVERED ITEM OR SERVICE: HCPCS | Performed by: EMERGENCY MEDICINE

## 2025-06-22 PROCEDURE — 700102 HCHG RX REV CODE 250 W/ 637 OVERRIDE(OP): Performed by: EMERGENCY MEDICINE

## 2025-06-22 PROCEDURE — 85025 COMPLETE CBC W/AUTO DIFF WBC: CPT

## 2025-06-22 PROCEDURE — 96375 TX/PRO/DX INJ NEW DRUG ADDON: CPT

## 2025-06-22 PROCEDURE — 87077 CULTURE AEROBIC IDENTIFY: CPT | Mod: 91

## 2025-06-22 PROCEDURE — 700111 HCHG RX REV CODE 636 W/ 250 OVERRIDE (IP): Mod: JZ | Performed by: EMERGENCY MEDICINE

## 2025-06-22 PROCEDURE — 87186 SC STD MICRODIL/AGAR DIL: CPT

## 2025-06-22 PROCEDURE — 36415 COLL VENOUS BLD VENIPUNCTURE: CPT

## 2025-06-22 PROCEDURE — 96374 THER/PROPH/DIAG INJ IV PUSH: CPT

## 2025-06-22 PROCEDURE — 83690 ASSAY OF LIPASE: CPT

## 2025-06-22 PROCEDURE — 74177 CT ABD & PELVIS W/CONTRAST: CPT

## 2025-06-22 PROCEDURE — 99285 EMERGENCY DEPT VISIT HI MDM: CPT

## 2025-06-22 PROCEDURE — 83605 ASSAY OF LACTIC ACID: CPT

## 2025-06-22 RX ORDER — OMEPRAZOLE 20 MG/1
20 CAPSULE, DELAYED RELEASE ORAL DAILY
Qty: 30 CAPSULE | Refills: 0 | Status: SHIPPED | OUTPATIENT
Start: 2025-06-22

## 2025-06-22 RX ORDER — ONDANSETRON 2 MG/ML
4 INJECTION INTRAMUSCULAR; INTRAVENOUS ONCE
Status: COMPLETED | OUTPATIENT
Start: 2025-06-22 | End: 2025-06-22

## 2025-06-22 RX ORDER — KETOROLAC TROMETHAMINE 15 MG/ML
15 INJECTION, SOLUTION INTRAMUSCULAR; INTRAVENOUS ONCE
Status: COMPLETED | OUTPATIENT
Start: 2025-06-22 | End: 2025-06-22

## 2025-06-22 RX ORDER — MORPHINE SULFATE 4 MG/ML
2 INJECTION INTRAVENOUS ONCE
Status: COMPLETED | OUTPATIENT
Start: 2025-06-22 | End: 2025-06-22

## 2025-06-22 RX ORDER — SULFAMETHOXAZOLE AND TRIMETHOPRIM 800; 160 MG/1; MG/1
1 TABLET ORAL 2 TIMES DAILY
Qty: 10 TABLET | Refills: 0 | Status: ACTIVE | OUTPATIENT
Start: 2025-06-22 | End: 2025-06-27

## 2025-06-22 RX ORDER — SULFAMETHOXAZOLE AND TRIMETHOPRIM 800; 160 MG/1; MG/1
1 TABLET ORAL ONCE
Status: COMPLETED | OUTPATIENT
Start: 2025-06-22 | End: 2025-06-22

## 2025-06-22 RX ADMIN — HYOSCYAMINE SULFATE 30 ML: 0.12 ELIXIR ORAL at 22:52

## 2025-06-22 RX ADMIN — MORPHINE SULFATE 2 MG: 4 INJECTION INTRAVENOUS at 19:44

## 2025-06-22 RX ADMIN — IOHEXOL 100 ML: 350 INJECTION, SOLUTION INTRAVENOUS at 21:16

## 2025-06-22 RX ADMIN — ONDANSETRON 4 MG: 2 INJECTION INTRAMUSCULAR; INTRAVENOUS at 19:45

## 2025-06-22 RX ADMIN — KETOROLAC TROMETHAMINE 15 MG: 15 INJECTION, SOLUTION INTRAMUSCULAR; INTRAVENOUS at 22:52

## 2025-06-22 RX ADMIN — SULFAMETHOXAZOLE AND TRIMETHOPRIM 1 TABLET: 800; 160 TABLET ORAL at 22:52

## 2025-06-22 ASSESSMENT — ENCOUNTER SYMPTOMS
DIARRHEA: 0
CHILLS: 0
FEVER: 0
VOMITING: 0
ABDOMINAL PAIN: 1
NAUSEA: 0

## 2025-06-22 ASSESSMENT — PAIN DESCRIPTION - PAIN TYPE: TYPE: ACUTE PAIN

## 2025-06-22 NOTE — LETTER
6/26/2025               Karen Tran  [address]  [facility] [address]        Dear Karen (MR#[id number])    This letter is sent in regards to your recent visit to the [facility] Emergency Department on 6/22/2025. During the visit, tests were performed to assist the physician in your medical diagnosis. A review of your tests requires that we notify you of the following:    Your urine culture was POSITIVE for a bacteria called Escherichia coli. The antibiotic prescribed for you (sulfamethoxazole-trimethoprim) should be active to treat this bacteria. It is important that you continue taking your antibiotic until it is finished.     Please feel free to contact me at the number below if you have any questions or concerns. Thank you for your cooperation in the matter.    Sincerely,  ED Culture Follow-Up Staff  David Grady, PharmD    [facility], Emergency Department  [address] [place] [id number] [phone] (ED Culture Line)

## 2025-06-23 VITALS
RESPIRATION RATE: 24 BRPM | HEART RATE: 69 BPM | DIASTOLIC BLOOD PRESSURE: 56 MMHG | HEIGHT: 55 IN | SYSTOLIC BLOOD PRESSURE: 113 MMHG | WEIGHT: 169.09 LBS | TEMPERATURE: 97.9 F | BODY MASS INDEX: 39.13 KG/M2 | OXYGEN SATURATION: 95 %

## 2025-06-23 NOTE — PROGRESS NOTES
CHIEF COMPLAINT  Chief Complaint   Patient presents with    Abdominal Pain     Pain started 3 days ago radiates to umbilical are    Back Pain     Sx's started 3 days ago with urinate sensation.     Subjective:   Karen Tran is a 69 y.o. female who presents to urgent care with concerns for worsening abdominal wall and pelvic pain.  Patient reports that pain starts in her back and radiates to her umbilical and lower pelvis.  She reports pain similar to a cramping sensation and states it has progressively worsened over the last several days.  Patient states pain is as high as a 9 out of 10.  She denies any symptoms of nausea or vomiting.  No symptoms of diarrhea.  No fever or chills.  Patient denies any symptoms of dysuria, but does state urgency after urinating.  She also reports noting small drops of blood to urine during symptom onset.  She denies any history of kidney stones.  No difficulty urinating.      Review of Systems   Constitutional:  Negative for chills and fever.   Gastrointestinal:  Positive for abdominal pain. Negative for diarrhea, nausea and vomiting.   Genitourinary:  Positive for hematuria and urgency. Negative for dysuria and frequency.       PAST MEDICAL HISTORY  There are no active problems to display for this patient.      SURGICAL HISTORY  patient denies any surgical history    ALLERGIES  Allergies[1]    CURRENT MEDICATIONS  Home Medications       Reviewed by Topher Fermin Ass't (Medical Assistant) on 06/22/25 at 1702  Med List Status: <None>     Medication Last Dose Status   acetaminophen (TYLENOL) 500 MG Tab Taking Active   atorvastatin (LIPITOR) 40 MG Tab Taking Active   cyclobenzaprine (FLEXERIL) 10 mg Tab Taking Active   ibuprofen (MOTRIN) 600 MG Tab Taking Active   levothyroxine (SYNTHROID) 88 MCG Tab Taking Active   methocarbamol (ROBAXIN) 750 MG Tab Taking Active   naproxen (NAPROSYN) 500 MG Tab Taking Active   pravastatin (PRAVACHOL) 20 MG Tab Taking Active  "  prochlorperazine (COMPAZINE) 10 MG Tab Not Taking Active   SUMAtriptan (IMITREX) 50 MG Tab Not Taking Active                    SOCIAL HISTORY  Social History     Tobacco Use    Smoking status: Never    Smokeless tobacco: Never   Vaping Use    Vaping status: Never Used   Substance and Sexual Activity    Alcohol use: Not Currently     Comment: occasional    Drug use: No    Sexual activity: Not on file       FAMILY HISTORY  History reviewed. No pertinent family history.      Medications, Allergies, and current problem list reviewed today in Epic.     Objective:     /70 (BP Location: Left arm, Patient Position: Sitting, BP Cuff Size: Adult)   Pulse 76   Temp 36.9 °C (98.4 °F) (Temporal)   Resp 16   Ht 1.448 m (4' 9\")   Wt 76 kg (167 lb 8.8 oz)   SpO2 96%     Physical Exam  Vitals reviewed.   Constitutional:       General: She is not in acute distress.     Appearance: Normal appearance. She is not ill-appearing or toxic-appearing.   HENT:      Mouth/Throat:      Mouth: Mucous membranes are moist.      Pharynx: Oropharynx is clear.   Cardiovascular:      Rate and Rhythm: Normal rate.      Pulses: Normal pulses.   Pulmonary:      Effort: Pulmonary effort is normal.   Abdominal:      General: Abdomen is flat.      Tenderness: There is abdominal tenderness. There is no right CVA tenderness, left CVA tenderness or guarding.   Skin:     Capillary Refill: Capillary refill takes less than 2 seconds.   Neurological:      General: No focal deficit present.      Mental Status: She is alert.   Psychiatric:         Mood and Affect: Mood normal.         Lab Results/POC Test Results   Results for orders placed or performed in visit on 06/22/25   POCT Urinalysis    Collection Time: 06/22/25  5:28 PM   Result Value Ref Range    POC Color yellow Negative    POC Appearance clear Negative    POC Glucose negative Negative mg/dL    POC Bilirubin negative Negative mg/dL    POC Ketones negative Negative mg/dL    POC Specific " Gravity 1.010 <1.005 - >1.030    POC Blood small Negative    POC Urine PH 6.5 5.0 - 8.0    POC Protein negative Negative mg/dL    POC Urobiligen 0.2 Negative (0.2) mg/dL    POC Nitrites negative Negative    POC Leukocyte Esterase small Negative             Assessment/Plan:     Diagnosis and associated orders:     1. Lower abdominal pain        2. Urgency of urination  POCT Urinalysis      3. Hematuria of unknown cause           Comments/MDM:     Patient arrives urgent care with 3-day history of worsening abdominal pain.  Patient reports that pain radiates from back to lower abdominal and umbilicus.  States pain is similar to cramping sensation, and at times is a 9 out of 10.  No fevers or chills.  No nausea or vomiting.  No diarrhea.  Does report urgency and hematuria.  Upon physical exam patient is alert and nontoxic-appearing.  She is uncomfortable during visit and in notable pain.  POC urine positive for blood and leukocyte esterase.  Advised patient on potential etiology of nephrolithiasis given description of pain as well as reports of blood to urine.  Patient is notably uncomfortable today in clinic.  Given severity of pain I do think patient warrants further imaging and diagnostics and advised follow up in ER.  Patient verbalized understanding is comfortable with plan.  Transfer center notified.         Differential diagnosis, natural history, supportive care, and indications for immediate follow-up discussed.    Advised the patient to follow-up with the primary care physician for recheck, reevaluation, and consideration of further management.    Please note that this dictation was created using voice recognition software. I have made a reasonable attempt to correct obvious errors, but I expect that there are errors of grammar and possibly content that I did not discover before finalizing the note.    This note was electronically signed by KINA Day.         [1] No Known Allergies

## 2025-06-23 NOTE — ED NOTES
Discussed and educated pt on discharge instructions. Educated on new medications. Pt verbalized understanding of discharge instructions to follow up with PCP and to return to ER if condition worsens. All Ivs removed. Pt walked out of the ER.

## 2025-06-23 NOTE — ED TRIAGE NOTES
"Chief Complaint   Patient presents with    Abdominal Pain     Gen abd pain x3 days. Denies N/V/D, dysuria, or fevers. Reports also pain to central low back. States approx 8 years ago she experienced \"something similar they said it was something like an infection in my kidneys\". I pad  used to speak with patient [place] [id number].     Physical Exam  Pulmonary:      Effort: Tachypnea present.   Skin:     General: Skin is warm and dry.   Neurological:      Mental Status: She is alert.       BP (!) 150/89   Pulse 69   Temp 36.6 °C (97.9 °F) (Temporal)   Resp (!) 24   Ht 1.397 m (4' 7\")   Wt 76.7 kg (169 lb 1.5 oz)   SpO2 96%   BMI 39.30 kg/m²     "

## 2025-06-23 NOTE — ED PROVIDER NOTES
"ED Provider Note    CHIEF COMPLAINT  Chief Complaint   Patient presents with    Abdominal Pain     Gen abd pain x3 days. Denies N/V/D, dysuria, or fevers. Reports also pain to central low back. States approx 8 years ago she experienced \"something similar they said it was something like an infection in my kidneys\". I pad  used to speak with patient Az [id number].       EXTERNAL RECORDS REVIEWED  I have reviewed previous labs.  And remote abdominal CT in 2018 which was diverticulosis without diverticulitis.    HPI/ROS  LIMITATION TO HISTORY   Select: Language ipad,  Used   OUTSIDE HISTORIAN(S):  None.    Karen Tran is a 69 y.o. female who presents to the emergency department complaining of abdominal pain.  The patient's been having episodes of diffuse crampy abdominal pain which is mostly periumbilical and then spreads out to both sides.  Comes and goes.  Seems to be more prevalent at night.  She states it last about 5 minutes.  She denies any nausea or vomiting.  No diarrhea but she does often strain to have a bowel movement.  No dysuria hematuria Krishen or frequency.  She reports a history of similar symptoms in the past and she was told she had an infection in her kidneys.  No previous abdominal operations.    PAST MEDICAL HISTORY   has a past medical history of Arthritis, Hypercholesteremia, and Thyroid disorder.    SURGICAL HISTORY  patient denies any surgical history    FAMILY HISTORY  History reviewed. No pertinent family history.    SOCIAL HISTORY  Social History     Tobacco Use    Smoking status: Never    Smokeless tobacco: Never   Vaping Use    Vaping status: Never Used   Substance and Sexual Activity    Alcohol use: Not Currently     Comment: occasional    Drug use: No    Sexual activity: Not on file       CURRENT MEDICATIONS  Home Medications       Reviewed by Marisel Coreas R.N. (Registered Nurse) on 06/22/25 at 9253  Med List Status: Not Addressed     Medication Last " "Dose Status   acetaminophen (TYLENOL) 500 MG Tab  Active   atorvastatin (LIPITOR) 40 MG Tab  Active   cyclobenzaprine (FLEXERIL) 10 mg Tab  Active   ibuprofen (MOTRIN) 600 MG Tab  Active   levothyroxine (SYNTHROID) 88 MCG Tab  Active   methocarbamol (ROBAXIN) 750 MG Tab  Active   naproxen (NAPROSYN) 500 MG Tab  Active   pravastatin (PRAVACHOL) 20 MG Tab  Active   prochlorperazine (COMPAZINE) 10 MG Tab  Active   SUMAtriptan (IMITREX) 50 MG Tab  Active                    ALLERGIES  Allergies[1]    PHYSICAL EXAM  VITAL SIGNS: BP (!) 150/89   Pulse 69   Temp 36.6 °C (97.9 °F) (Temporal)   Resp (!) 24   Ht 1.397 m (4' 7\")   Wt 76.7 kg (169 lb 1.5 oz)   SpO2 96%   BMI 39.30 kg/m²      Constitutional: Well developed, Well nourished, No acute distress, Non-toxic appearance.   HENT: Normocephalic, Atraumatic,\  Eyes: PERRL, EOMI, Conjunctiva normal, No discharge.   Neck: Normal range of motion,  Cardiovascular: Normal heart rate, Normal rhythm, No murmurs, No rubs, No gallops.   Thorax & Lungs: Normal breath sounds, No respiratory distress, No wheezing,   Abdomen: Soft nontender nondistended.  No umbilical hernias appreciable.  negative Koehler sign no tenderness over the right lower quadrant..   Skin: Warm, Dry, No erythema, No rash.   Back: No tenderness, No CVA tenderness.   Musculoskeletal: Good range of motion in all major joints.  Good pulses in both feet.  Neurologic: Alert no focal deficits.      EKG/LABS    Results for orders placed or performed during the hospital encounter of 06/22/25   CBC WITH DIFFERENTIAL    Collection Time: 06/22/25  7:21 PM   Result Value Ref Range    WBC 7.2 4.8 - 10.8 K/uL    RBC 4.64 4.20 - 5.40 M/uL    Hemoglobin 13.5 12.0 - 16.0 g/dL    Hematocrit 40.2 37.0 - 47.0 %    MCV 86.6 81.4 - 97.8 fL    MCH 29.1 27.0 - 33.0 pg    MCHC 33.6 32.2 - 35.5 g/dL    RDW 38.1 35.9 - 50.0 fL    Platelet Count 246 164 - 446 K/uL    MPV 9.5 9.0 - 12.9 fL    Neutrophils-Polys 58.40 44.00 - 72.00 %    " Lymphocytes 30.30 22.00 - 41.00 %    Monocytes 7.90 0.00 - 13.40 %    Eosinophils 2.40 0.00 - 6.90 %    Basophils 0.60 0.00 - 1.80 %    Immature Granulocytes 0.40 0.00 - 0.90 %    Nucleated RBC 0.00 0.00 - 0.20 /100 WBC    Neutrophils (Absolute) 4.23 1.82 - 7.42 K/uL    Lymphs (Absolute) 2.19 1.00 - 4.80 K/uL    Monos (Absolute) 0.57 0.00 - 0.85 K/uL    Eos (Absolute) 0.17 0.00 - 0.51 K/uL    Baso (Absolute) 0.04 0.00 - 0.12 K/uL    Immature Granulocytes (abs) 0.03 0.00 - 0.11 K/uL    NRBC (Absolute) 0.00 K/uL   COMP METABOLIC PANEL    Collection Time: 06/22/25  7:21 PM   Result Value Ref Range    Sodium 141 135 - 145 mmol/L    Potassium 4.1 3.6 - 5.5 mmol/L    Chloride 107 96 - 112 mmol/L    Co2 23 20 - 33 mmol/L    Anion Gap 11.0 7.0 - 16.0    Glucose 113 (H) 65 - 99 mg/dL    Bun 13 8 - 22 mg/dL    Creatinine 0.63 0.50 - 1.40 mg/dL    Calcium 8.9 8.5 - 10.5 mg/dL    Correct Calcium 8.8 8.5 - 10.5 mg/dL    AST(SGOT) 20 12 - 45 U/L    ALT(SGPT) 18 2 - 50 U/L    Alkaline Phosphatase 149 (H) 30 - 99 U/L    Total Bilirubin 0.3 0.1 - 1.5 mg/dL    Albumin 4.1 3.2 - 4.9 g/dL    Total Protein 7.4 6.0 - 8.2 g/dL    Globulin 3.3 1.9 - 3.5 g/dL    A-G Ratio 1.2 g/dL   LIPASE    Collection Time: 06/22/25  7:21 PM   Result Value Ref Range    Lipase 47 11 - 82 U/L   LACTIC ACID    Collection Time: 06/22/25  7:21 PM   Result Value Ref Range    Lactic Acid 1.3 0.5 - 2.0 mmol/L   ESTIMATED GFR    Collection Time: 06/22/25  7:21 PM   Result Value Ref Range    GFR (CKD-EPI) 97 >60 mL/min/1.73 m 2   URINALYSIS CULTURE, IF INDICATED    Collection Time: 06/22/25  7:45 PM    Specimen: Urine, Clean Catch; Blood   Result Value Ref Range    Color Yellow     Character Clear     Specific Gravity 1.010 <1.035    Ph 6.5 5.0 - 8.0    Glucose Negative Negative mg/dL    Ketones Negative Negative mg/dL    Protein Negative Negative mg/dL    Bilirubin Negative Negative    Urobilinogen, Urine 1.0 <=1.0 EU/dL    Nitrite Negative Negative    Leukocyte  Esterase Small (A) Negative    Occult Blood Trace (A) Negative    Micro Urine Req Microscopic    URINE MICROSCOPIC (W/UA)    Collection Time: 25  7:45 PM   Result Value Ref Range    WBC 11-20 (A) /hpf    RBC 3-5 (A) /hpf    Bacteria None Seen None /hpf    Epithelial Cells 0-2 0 - 5 /hpf    Urine Casts 0-2 0 - 2 /lpf   EKG (NOW)    Collection Time: 25  8:21 PM   Result Value Ref Range    Report       [facility] Emergency Dept.    Test Date:  2025  Pt Name:    KELLY SARABIA              Department: [facility]  MRN:        [id number]                      [facility]  Gender:     Female                       Technician: [id number]  :        [id number]                   Requested By:ALBARO JON  Order #:    [id number]                    Reading MD: ALBARO JON. AMD    Measurements  Intervals                                Axis  Rate:       60                           P:          -40  KS:         148                          QRS:        -37  QRSD:       97                           T:          -31  QT:         419  QTc:        419    Interpretive Statements  Sinus rhythm  Left axis deviation  Borderline T abnormalities, inferior leads  No previous ECG available for comparison  Electronically Signed On 2025 20:21:56 PDT by ALBARO JON. AMD     URINE CULTURE(NEW)    Collection Time: 25  8:41 PM    Specimen: Urine   Result Value Ref Range    Significant Indicator NEG     Source UR     Site -     Culture Result -       I have independently interpreted this EKG    RADIOLOGY/PROCEDURES   I have independently interpreted the diagnostic imaging associated with this visit and am waiting the final reading from the radiologist.   My preliminary interpretation is as follows: Reviewed CT agree with radiology results    Radiologist interpretation:  CT-ABDOMEN-PELVIS WITH   Final Result      1.  No acute inflammation in the abdomen or pelvis.   2.  Colonic  diverticulosis.   3.  Small hiatal hernia.   4.  Cardiomegaly.             COURSE & MEDICAL DECISION MAKING    ASSESSMENT, COURSE AND PLAN  Care Narrative:     69-year-old female presents to the emergency department with crampy abdominal discomfort.  Pain is primarily periumbilical and has minimal associated symptoms.    Patient seen exam differential diagnose includes but is not limited to constipation, gas pains, bowel obstruction, hernia, pancreatitis, less likely cardiac etiology, UTI, or bowel ischemia.    The patient worked up with labs and imaging.  CBC CMP lipase and lactic acid are unremarkable.  An EKG done with his cardiac EKG is unremarkable and thinks requires further workup.  CT shows diverticulosis diverticulitis with small hiatal hernia enlarged heart.    Still some mild diffuse abdominal discomfort.  There may be a small fat-containing umbilical hernia.  There is no bowel\in the hernia.    Of conservative and ultrasound of her gallbladder but her pain is not focal in the right upper quadrant she does not have right upper quadrant tenderness or positive Koehler sign of this can be useful.    The patient has diffuse tenderness but does not have peritonitis.    Upon reassessment patient is having some discomfort.  She still has minimal tenderness.  7 some flank pain.  Needed for GI cocktail and some Toradol and a p.o. challenge.      She is reassessed after the above medication she felt completely better pain is resolved.  Repeat exam is benign she would like to go home.  Antibiotics and a PPI and have her follow-up with her doctor.  This was explained with a .      The plan will be to discharge and close return precautions.  We will prescribe her antibiotics for UTI.  Will return to the emerged part in 24 hours for recheck if she is having pain.  She will return sooner if worsening pain fever vomiting or other concerns.        Arthur Gant, A.P.R.N.  [address]  [facility] [address]  [place]  [address]  [phone]              ADDITIONAL PROBLEMS MANAGED  High cholesterol  Hypothyroidism    DISPOSITION AND DISCUSSIONS      FINAL DIAGNOSIS  1. Generalized abdominal pain    2. Acute UTI    3.  Possible gastritis     Electronically signed by: Vimal Polo M.D., 6/22/2025 7:13 PM           [1] No Known Allergies

## 2025-06-23 NOTE — DISCHARGE INSTRUCTIONS
Rest, drink with your fluids.  Take antibiotics as prescribed.  Return to the ER in 24 hours for recheck of her 7 pain.  Return sooner for worsened pain fever vomiting or other concerns.

## 2025-06-25 LAB
BACTERIA UR CULT: ABNORMAL
BACTERIA UR CULT: ABNORMAL
SIGNIFICANT IND 70042: ABNORMAL
SITE SITE: ABNORMAL
SOURCE SOURCE: ABNORMAL

## 2025-06-26 NOTE — ED NOTES
"ED Positive Culture Follow-up/Notification Note:    Date: 6/26/2025     Patient seen in the ED on 6/22/2025 for abdominal pain.  Patient describes the pain as crampy and is mostly periumbilical and spreads to both sides of the abdomen.  Patient denies any dysuria, hematuria, urinary frequency, nausea, or vomiting.  Patient reports the symptoms are similar to past UTIs.  CT abdomen and pelvis showed no acute inflammation in the abdomen or pelvis and chronic diverticulosis.      1. Generalized abdominal pain    2. Acute UTI       Discharge Medication List as of 6/22/2025 11:52 PM        START taking these medications    Details   sulfamethoxazole-trimethoprim (BACTRIM DS) 800-160 MG tablet Take 1 Tablet by mouth 2 times a day for 5 days., Disp-10 Tablet, R-0, Normal      omeprazole (PRILOSEC) 20 MG delayed-release capsule Take 1 Capsule by mouth every day., Disp-30 Capsule, R-0, Normal             Allergies: Patient has no known allergies.     Vitals:    06/22/25 1818 06/23/25 0010   BP: (!) 150/89 113/56   Pulse: 69 69   Resp: (!) 24    Temp: 36.6 °C (97.9 °F)    TempSrc: Temporal    SpO2: 96% 95%   Weight: 76.7 kg (169 lb 1.5 oz)    Height: 1.397 m (4' 7\")        Final cultures:   Results       Procedure Component Value Units Date/Time    URINE CULTURE(NEW) [[id number]]  (Abnormal)  (Susceptibility) Collected: 06/22/25 1945    Order Status: Completed Specimen: Urine Updated: 06/25/25 1054     Significant Indicator POS     Source UR     Site -     Culture Result Usual urogenital jesús 10-50,000 cfu/mL      Escherichia coli  10-50,000 cfu/mL      Susceptibility       Escherichia coli (2)       Antibiotic Interpretation Microscan   Method Status    Ampicillin/sulbactam Sensitive <=4/2 mcg/mL DREA Final    Amikacin  [*]  Sensitive <=16 mcg/mL DREA Final    Ampicillin Sensitive <=8 mcg/mL DREA Final    Amoxicillin/Clavulanic Acid Sensitive <=8/4 mcg/mL DREA Final    Aztreonam  [*]  Sensitive <=4 mcg/mL DREA Final    " Ceftolozane+Tazobactam  [*]  Sensitive <=2 mcg/mL DREA Final    Ceftriaxone Sensitive <=1 mcg/mL DREA Final    Ceftazidime  [*]  Sensitive <=1 mcg/mL DREA Final    Cefazolin Sensitive <=2 mcg/mL DREA Final     Breakpoints when Cefazolin is used for therapy of infections  other than uncomplicated UTIs due to Enterobacterales are as  follows:  DREA and Interpretation:  <=2 S  4 I  >=8 R         Ciprofloxacin Sensitive <=0.25 mcg/mL DREA Final    Cefepime Sensitive <=2 mcg/mL DREA Final    Cefuroxime Sensitive <=4 mcg/mL DREA Final    Ceftazidime+Avibactam  [*]  Sensitive <=4 mcg/mL DREA Final    Ertapenem  [*]  Sensitive <=0.5 mcg/mL DREA Final    Nitrofurantoin Sensitive <=32 mcg/mL DREA Final    Gentamicin Sensitive <=2 mcg/mL DREA Final    Imipenem  [*]  Sensitive <=1 mcg/mL DREA Final    Levofloxacin Sensitive <=0.5 mcg/mL DREA Final    Meropenem Sensitive <=1 mcg/mL DREA Final    Meropenem/Vaborbactam  [*]  Sensitive <=2 mcg/mL DREA Final    Minocycline Sensitive <=4 mcg/mL DREA Final    Pip/Tazobactam Sensitive <=8 mcg/mL DREA Final    Trimeth/Sulfa Sensitive <=0.5/9.5 mcg/mL DREA Final    Tetracycline  [*]  Sensitive <=4 mcg/mL DREA Final    Tigecycline Sensitive <=2 mcg/mL DREA Final    Tobramycin Sensitive <=2 mcg/mL DREA Final               [*]  Suppressed Antibiotic                   URINALYSIS CULTURE, IF INDICATED [[id number]]  (Abnormal) Collected: 06/22/25 1945    Order Status: Completed Specimen: Blood from Urine, Clean Catch Updated: 06/22/25 2040     Color Yellow     Character Clear     Specific Gravity 1.010     Ph 6.5     Glucose Negative mg/dL      Ketones Negative mg/dL      Protein Negative mg/dL      Bilirubin Negative     Urobilinogen, Urine 1.0 EU/dL      Nitrite Negative     Leukocyte Esterase Small     Occult Blood Trace     Micro Urine Req Microscopic            Plan:   Urine culture is positive for e coli.  Appropriate antibiotic therapy prescribed. No changes required based upon culture result.  Sent letter to  patient to notify of positive culture result and encourage compliance with prescribed antibiotics.     David Grady, PharmD